# Patient Record
Sex: FEMALE | Race: BLACK OR AFRICAN AMERICAN | NOT HISPANIC OR LATINO | Employment: OTHER | URBAN - METROPOLITAN AREA
[De-identification: names, ages, dates, MRNs, and addresses within clinical notes are randomized per-mention and may not be internally consistent; named-entity substitution may affect disease eponyms.]

---

## 2017-11-27 ENCOUNTER — TRANSCRIBE ORDERS (OUTPATIENT)
Dept: ADMINISTRATIVE | Facility: HOSPITAL | Age: 62
End: 2017-11-27

## 2017-11-27 DIAGNOSIS — Z12.31 VISIT FOR SCREENING MAMMOGRAM: Primary | ICD-10-CM

## 2017-12-19 ENCOUNTER — HOSPITAL ENCOUNTER (OUTPATIENT)
Dept: MAMMOGRAPHY | Facility: HOSPITAL | Age: 62
Discharge: HOME | End: 2017-12-19
Attending: FAMILY MEDICINE | Admitting: FAMILY MEDICINE

## 2017-12-19 DIAGNOSIS — Z12.31 VISIT FOR SCREENING MAMMOGRAM: ICD-10-CM

## 2017-12-19 PROCEDURE — 77067 SCR MAMMO BI INCL CAD: CPT | Performed by: RADIOLOGY

## 2017-12-19 PROCEDURE — 77063 BREAST TOMOSYNTHESIS BI: CPT | Performed by: RADIOLOGY

## 2017-12-19 PROCEDURE — G0202 SCR MAMMO BI INCL CAD: HCPCS

## 2017-12-19 PROCEDURE — 77063 BREAST TOMOSYNTHESIS BI: CPT

## 2018-06-27 ENCOUNTER — TRANSCRIBE ORDERS (OUTPATIENT)
Dept: ADMINISTRATIVE | Facility: HOSPITAL | Age: 63
End: 2018-06-27

## 2018-06-27 ENCOUNTER — HOSPITAL ENCOUNTER (OUTPATIENT)
Dept: GENERAL RADIOLOGY | Facility: HOSPITAL | Age: 63
Discharge: HOME | End: 2018-06-27
Attending: FAMILY MEDICINE | Admitting: FAMILY MEDICINE

## 2018-06-27 DIAGNOSIS — R06.02 SHORTNESS OF BREATH: Primary | ICD-10-CM

## 2018-06-27 DIAGNOSIS — R06.02 SHORTNESS OF BREATH: ICD-10-CM

## 2018-06-27 PROCEDURE — 71046 X-RAY EXAM CHEST 2 VIEWS: CPT

## 2018-06-28 ENCOUNTER — TRANSCRIBE ORDERS (OUTPATIENT)
Dept: ADMINISTRATIVE | Facility: HOSPITAL | Age: 63
End: 2018-06-28

## 2018-06-28 DIAGNOSIS — R10.10 UPPER ABDOMINAL PAIN: Primary | ICD-10-CM

## 2018-06-29 ENCOUNTER — HOSPITAL ENCOUNTER (OUTPATIENT)
Dept: ULTRASOUND IMAGING | Facility: HOSPITAL | Age: 63
Discharge: HOME | End: 2018-06-29
Attending: FAMILY MEDICINE | Admitting: FAMILY MEDICINE

## 2018-06-29 DIAGNOSIS — R10.10 UPPER ABDOMINAL PAIN: ICD-10-CM

## 2018-06-29 PROCEDURE — 76705 ECHO EXAM OF ABDOMEN: CPT

## 2019-01-02 ENCOUNTER — TRANSCRIBE ORDERS (OUTPATIENT)
Dept: ADMINISTRATIVE | Facility: HOSPITAL | Age: 64
End: 2019-01-02

## 2019-01-02 DIAGNOSIS — Z12.31 VISIT FOR SCREENING MAMMOGRAM: Primary | ICD-10-CM

## 2019-01-04 ENCOUNTER — TRANSCRIBE ORDERS (OUTPATIENT)
Dept: ADMINISTRATIVE | Facility: HOSPITAL | Age: 64
End: 2019-01-04

## 2019-01-04 ENCOUNTER — HOSPITAL ENCOUNTER (OUTPATIENT)
Dept: GENERAL RADIOLOGY | Facility: HOSPITAL | Age: 64
Discharge: HOME | End: 2019-01-04
Admitting: NURSE PRACTITIONER

## 2019-01-04 DIAGNOSIS — K58.0 IRRITABLE BOWEL SYNDROME WITH DIARRHEA: ICD-10-CM

## 2019-01-04 DIAGNOSIS — K58.0 IRRITABLE BOWEL SYNDROME WITH DIARRHEA: Primary | ICD-10-CM

## 2019-01-04 PROCEDURE — 74019 RADEX ABDOMEN 2 VIEWS: CPT

## 2019-01-23 DIAGNOSIS — M25.512 PAIN OF BOTH SHOULDER JOINTS: Primary | ICD-10-CM

## 2019-01-23 DIAGNOSIS — M25.511 PAIN OF BOTH SHOULDER JOINTS: Primary | ICD-10-CM

## 2019-01-24 ENCOUNTER — OFFICE VISIT (OUTPATIENT)
Dept: ORTHOPEDIC SURGERY | Facility: CLINIC | Age: 64
End: 2019-01-24

## 2019-01-24 VITALS — RESPIRATION RATE: 18 BRPM | HEIGHT: 63 IN | WEIGHT: 144 LBS | BODY MASS INDEX: 25.52 KG/M2

## 2019-01-24 DIAGNOSIS — M25.512 ARTHRALGIA OF LEFT SHOULDER REGION: Primary | ICD-10-CM

## 2019-01-24 DIAGNOSIS — M75.82 ROTATOR CUFF TENDINITIS, LEFT: ICD-10-CM

## 2019-01-24 DIAGNOSIS — M19.019 AC JOINT ARTHROPATHY: ICD-10-CM

## 2019-01-24 PROCEDURE — 99203 OFFICE O/P NEW LOW 30 MIN: CPT | Performed by: PHYSICIAN ASSISTANT

## 2019-01-24 RX ORDER — RANITIDINE HCL 75 MG
75 TABLET ORAL 2 TIMES DAILY
COMMUNITY
End: 2020-12-18

## 2019-01-24 RX ORDER — DICYCLOMINE HCL 20 MG
20 TABLET ORAL EVERY 6 HOURS PRN
COMMUNITY

## 2019-01-24 NOTE — PROGRESS NOTES
"Subjective   Patient ID: Melina Minaya is a 63 y.o. right hand dominant female  Pain of the Left Shoulder and Pain of the Right Shoulder         History of Present Illness  Patient presents with bilateral shoulder pain left worse than right that has been ongoing for several months.  She denies injury or trauma.  She denies numbness or tingling to the upper extremities.  She has tried over-the-counter analgesics with no relief.  She has tried rest and warm heat with no improvement.  Unfortunately, the patient cannot tolerate oral anti-inflammatories due to \"stomach issues\"    He shouldn't reports movement outward and above her head intensify the left shoulder pain.  She finds it difficult to sleep at night due to the shoulder arthralgia  Pain Score: 9  Pain Location: Shoulder  Pain Orientation: Right, Left(left greater than right)     Pain Descriptors: Stabbing, Shooting  Pain Frequency: Constant/continuous  Pain Onset: Ongoing  Date Pain First Started: (reports pain sice October 2018)  Clinical Progression: Gradually worsening  Aggravating Factors: Stretching, Straightening        Pain Intervention(s): Rest  Result of Injury: No  Work-Related Injury: No    Past Medical History:   Diagnosis Date   • Hypertension         Past Surgical History:   Procedure Laterality Date   • APPENDECTOMY     • BREAST BIOPSY     • CHOLECYSTECTOMY     • HYSTERECTOMY     • OOPHORECTOMY         Family History   Problem Relation Age of Onset   • Cancer Mother    • Breast cancer Mother 50   • Cancer Father    • Ovarian cancer Neg Hx        Social History     Socioeconomic History   • Marital status:      Spouse name: Not on file   • Number of children: Not on file   • Years of education: Not on file   • Highest education level: Not on file   Social Needs   • Financial resource strain: Not on file   • Food insecurity - worry: Not on file   • Food insecurity - inability: Not on file   • Transportation needs - medical: Not on file " "  • Transportation needs - non-medical: Not on file   Occupational History   • Not on file   Tobacco Use   • Smoking status: Never Smoker   • Smokeless tobacco: Never Used   Substance and Sexual Activity   • Alcohol use: Yes     Alcohol/week: 1.2 oz     Types: 2 Cans of beer per week     Comment: Moderately every other day   • Drug use: No   • Sexual activity: Defer   Other Topics Concern   • Not on file   Social History Narrative   • Not on file         Current Outpatient Medications:   •  dicyclomine (BENTYL) 20 MG tablet, Take 20 mg by mouth Every 6 (Six) Hours., Disp: , Rfl:   •  estradiol (MINIVELLE, VIVELLE-DOT) 0.1 MG/24HR patch, Place 1 patch on the skin 2 (two) times a week., Disp: , Rfl:   •  lisinopril (PRINIVIL,ZESTRIL) 5 MG tablet, TAKE 1 TABLET EVERY DAY, Disp: , Rfl: 0  •  raNITIdine (ZANTAC) 75 MG tablet, Take 75 mg by mouth 2 (Two) Times a Day., Disp: , Rfl:     Allergies   Allergen Reactions   • Ceclor [Cefaclor]    • Lactose Intolerance (Gi)        Review of Systems   Constitutional: Negative.    HENT: Negative.    Respiratory: Negative.    Gastrointestinal: Negative.    Musculoskeletal: Positive for arthralgias (jake shoulder).   Skin: Negative.        Objective   Resp 18   Ht 160 cm (63\")   Wt 65.3 kg (144 lb)   BMI 25.51 kg/m²    Physical Exam   Constitutional: She is oriented to person, place, and time. She appears well-nourished.   Neck: No tracheal deviation present.   Pulmonary/Chest: Effort normal.   Musculoskeletal:        Left shoulder: She exhibits decreased range of motion (due to pain), tenderness (AC joint) and pain. She exhibits no deformity.        Left hand: She exhibits normal capillary refill and no swelling.   Neurological: She is alert and oriented to person, place, and time.   Skin: Capillary refill takes less than 2 seconds.   Psychiatric: She has a normal mood and affect.   Vitals reviewed.    Left Shoulder Exam     Range of Motion   Active abduction: 120   Forward " flexion: 120     Muscle Strength   The patient has normal left shoulder strength.    Tests   Cross arm: positive  Impingement: positive  Drop arm: negative  Sulcus: absent    Other   Sensation: normal              Neurologic Exam     Mental Status   Oriented to person, place, and time.          + Neer sign to CHIDIE    Assessment/Plan   Independent Review of Radiographic Studies:    Shows no acute fracture or dislocation.   There is acromial clavicular joint space arthritis to bilateral shoulders      Procedures       Melina was seen today for pain and pain.    Diagnoses and all orders for this visit:    Arthralgia of left shoulder region  -     MRI shoulder left wo contrast    Rotator cuff tendinitis, left  -     MRI shoulder left wo contrast    AC joint arthropathy  -     MRI shoulder left wo contrast       Orthopedic activities reviewed and patient expressed appreciation  Discussion of orthopedic goals  Risk, benefits, and merits of treatment alternatives reviewed with the patient and questions answered  Weight bearing parameters reviewed  Avoid offending activity  Ice, heat, and/or modalities as beneficial    Recommendations/Plan:  Patient is encouraged to call or return for any issues or concerns.    Patient would like to have an MRI before deciding on a left shoulder cortisone injection and/or physical therapy.  Patient agreeable to call or return sooner for any concerns.

## 2019-01-29 ENCOUNTER — HOSPITAL ENCOUNTER (OUTPATIENT)
Dept: MRI IMAGING | Facility: HOSPITAL | Age: 64
Discharge: HOME | End: 2019-01-29
Admitting: PHYSICIAN ASSISTANT

## 2019-01-29 PROCEDURE — 73221 MRI JOINT UPR EXTREM W/O DYE: CPT

## 2019-02-14 ENCOUNTER — HOSPITAL ENCOUNTER (OUTPATIENT)
Dept: MAMMOGRAPHY | Facility: HOSPITAL | Age: 64
Discharge: HOME | End: 2019-02-14
Attending: FAMILY MEDICINE | Admitting: FAMILY MEDICINE

## 2019-02-14 DIAGNOSIS — Z12.31 VISIT FOR SCREENING MAMMOGRAM: ICD-10-CM

## 2019-02-14 PROCEDURE — 77063 BREAST TOMOSYNTHESIS BI: CPT | Performed by: RADIOLOGY

## 2019-02-14 PROCEDURE — 77067 SCR MAMMO BI INCL CAD: CPT | Performed by: RADIOLOGY

## 2019-02-14 PROCEDURE — 77067 SCR MAMMO BI INCL CAD: CPT

## 2019-02-14 PROCEDURE — 77063 BREAST TOMOSYNTHESIS BI: CPT

## 2019-02-15 ENCOUNTER — OFFICE VISIT (OUTPATIENT)
Dept: ORTHOPEDIC SURGERY | Facility: CLINIC | Age: 64
End: 2019-02-15

## 2019-02-15 VITALS — BODY MASS INDEX: 25.52 KG/M2 | WEIGHT: 144 LBS | RESPIRATION RATE: 18 BRPM | HEIGHT: 63 IN

## 2019-02-15 DIAGNOSIS — M75.112 PARTIAL TEAR OF LEFT ROTATOR CUFF: ICD-10-CM

## 2019-02-15 DIAGNOSIS — M75.82 ROTATOR CUFF TENDINITIS, LEFT: ICD-10-CM

## 2019-02-15 DIAGNOSIS — M75.52 ACUTE SHOULDER BURSITIS, LEFT: Primary | ICD-10-CM

## 2019-02-15 PROCEDURE — 99213 OFFICE O/P EST LOW 20 MIN: CPT | Performed by: PHYSICIAN ASSISTANT

## 2019-02-15 RX ORDER — FLUTICASONE PROPIONATE 50 MCG
SPRAY, SUSPENSION (ML) NASAL
Refills: 2 | COMMUNITY
Start: 2019-01-21

## 2019-02-15 RX ORDER — MELOXICAM 15 MG/1
15 TABLET ORAL DAILY
Qty: 30 TABLET | Refills: 1 | Status: SHIPPED | OUTPATIENT
Start: 2019-02-15 | End: 2020-12-18

## 2019-02-15 NOTE — PROGRESS NOTES
Subjective   Patient ID: Melina Minaya is a 63 y.o. right hand dominant female  Follow-up and Pain of the Left Shoulder (Patient is here today for her MRI results, she states the pain is increasing and the shoulder is getting stiff. Her pain level is 5-6/10.)         History of Present Illness  Patient is following up in regards to left shoulder pain and to review MRI results of the left shoulder.  Patient states she still having discomfort to the left shoulder.  She reports the discomfort interferes with her sleep and daily activities                                                 Past Medical History:   Diagnosis Date   • Hypertension         Past Surgical History:   Procedure Laterality Date   • APPENDECTOMY     • BREAST BIOPSY     • CHOLECYSTECTOMY     • HYSTERECTOMY  1991   • OOPHORECTOMY Bilateral 1991       Family History   Problem Relation Age of Onset   • Cancer Mother    • Breast cancer Mother 50   • Cancer Father    • Ovarian cancer Neg Hx        Social History     Socioeconomic History   • Marital status:      Spouse name: Not on file   • Number of children: Not on file   • Years of education: Not on file   • Highest education level: Not on file   Social Needs   • Financial resource strain: Not on file   • Food insecurity - worry: Not on file   • Food insecurity - inability: Not on file   • Transportation needs - medical: Not on file   • Transportation needs - non-medical: Not on file   Occupational History   • Not on file   Tobacco Use   • Smoking status: Never Smoker   • Smokeless tobacco: Never Used   Substance and Sexual Activity   • Alcohol use: Yes     Alcohol/week: 1.2 oz     Types: 2 Cans of beer per week     Comment: Moderately every other day   • Drug use: No   • Sexual activity: Defer   Other Topics Concern   • Not on file   Social History Narrative   • Not on file         Current Outpatient Medications:   •  dicyclomine (BENTYL) 20 MG tablet, Take 20 mg by mouth Every 6 (Six)  "Hours., Disp: , Rfl:   •  estradiol (MINIVELLE, VIVELLE-DOT) 0.1 MG/24HR patch, Place 1 patch on the skin 2 (two) times a week., Disp: , Rfl:   •  fluticasone (FLONASE) 50 MCG/ACT nasal spray, INAHLE TWO PUFFS NASALLY TWO TIMES DAILY, Disp: , Rfl: 2  •  lisinopril (PRINIVIL,ZESTRIL) 5 MG tablet, TAKE 1 TABLET EVERY DAY, Disp: , Rfl: 0  •  meloxicam (MOBIC) 15 MG tablet, Take 1 tablet by mouth Daily., Disp: 30 tablet, Rfl: 1  •  raNITIdine (ZANTAC) 75 MG tablet, Take 75 mg by mouth 2 (Two) Times a Day., Disp: , Rfl:     Allergies   Allergen Reactions   • Ceclor [Cefaclor]    • Lactose Intolerance (Gi)        Review of Systems   Constitutional: Negative for fever.   HENT: Negative for voice change.    Eyes: Negative for visual disturbance.   Respiratory: Negative for shortness of breath.    Cardiovascular: Negative for chest pain.   Gastrointestinal: Negative for abdominal distention and abdominal pain.   Genitourinary: Negative for dysuria.   Musculoskeletal: Positive for arthralgias. Negative for gait problem and joint swelling.   Skin: Negative for rash.   Neurological: Negative for speech difficulty.   Hematological: Does not bruise/bleed easily.   Psychiatric/Behavioral: Negative for confusion.       Objective   Resp 18   Ht 160 cm (63\")   Wt 65.3 kg (144 lb)   BMI 25.51 kg/m²    Physical Exam   Constitutional: She is oriented to person, place, and time. She appears well-nourished.   Neck: No tracheal deviation present.   Musculoskeletal:        Left shoulder: She exhibits decreased range of motion and tenderness. She exhibits no deformity.   Neurological: She is alert and oriented to person, place, and time.   Psychiatric: She has a normal mood and affect.   Vitals reviewed.    Ortho Exam     Neurologic Exam     Mental Status   Oriented to person, place, and time.        Please see the previous office visit physical exam      Assessment/Plan   Independent Review of Radiographic Studies:    I did review MRI " results of the left shoulder with the patient.  There is a small tear to the distal suprasternal's tendon at its attachment site to the greater tuberosity, there is hypertrophic changes to the acromioclavicular joint, moderate amount of fluid in the subacromial bursa, type II acromion      Procedures       Melina was seen today for follow-up and pain.    Diagnoses and all orders for this visit:    Acute shoulder bursitis, left  -     Ambulatory Referral to Physical Therapy Evaluate and treat, Ortho; Heat; Strengthening, Stretching, ROM  -     meloxicam (MOBIC) 15 MG tablet; Take 1 tablet by mouth Daily.    Rotator cuff tendinitis, left  -     Ambulatory Referral to Physical Therapy Evaluate and treat, Ortho; Heat; Strengthening, Stretching, ROM  -     meloxicam (MOBIC) 15 MG tablet; Take 1 tablet by mouth Daily.    Partial tear of left rotator cuff  -     Ambulatory Referral to Physical Therapy Evaluate and treat, Ortho; Heat; Strengthening, Stretching, ROM  -     meloxicam (MOBIC) 15 MG tablet; Take 1 tablet by mouth Daily.       Orthopedic activities reviewed and patient expressed appreciation  Discussion of orthopedic goals  Risk, benefits, and merits of treatment alternatives reviewed with the patient and questions answered  Physical therapy referral given    Recommendations/Plan:  Exercise, medications, injections, other patient advice, and return appointment as noted.  Patient is encouraged to call or return for any issues or concerns.    We discussed the option of a cortisone injection as well as formal physical therapy.  However, patient states she has an extreme phobia and needles and would like to avoid an injection at this time.  Patient would also like to try conservative measures in lieu of surgical treatment at this time.  She states she can't tolerate anti-inflammatories sometimes irritates her acid reflux she would like to try the meloxicam and if she cannot tolerate this she will discontinue the  medicine and contact our office for the injection  Patient agreeable to call or return sooner for any concerns.

## 2019-02-25 ENCOUNTER — TREATMENT (OUTPATIENT)
Dept: PHYSICAL THERAPY | Facility: CLINIC | Age: 64
End: 2019-02-25

## 2019-02-25 DIAGNOSIS — M75.112 PARTIAL TEAR OF LEFT ROTATOR CUFF: Primary | ICD-10-CM

## 2019-02-25 DIAGNOSIS — G89.29 CHRONIC LEFT SHOULDER PAIN: ICD-10-CM

## 2019-02-25 DIAGNOSIS — M25.512 CHRONIC LEFT SHOULDER PAIN: ICD-10-CM

## 2019-02-25 PROCEDURE — 97110 THERAPEUTIC EXERCISES: CPT | Performed by: PHYSICAL THERAPIST

## 2019-02-25 PROCEDURE — 97161 PT EVAL LOW COMPLEX 20 MIN: CPT | Performed by: PHYSICAL THERAPIST

## 2019-02-25 NOTE — PROGRESS NOTES
Physical Therapy Initial Evaluation and Plan of Care      Patient: Melina Minaya   : 1955  Diagnosis/ICD-10 Code:  No primary diagnosis found.  Referring practitioner: GABRIELLA Lee    Subjective Evaluation    History of Present Illness  Date of onset: 10/25/2018  Mechanism of injury: Pt reports moving in September and noticing shoulder pain in October. Pt reports no known cause of pain. Pt reports sleeping on shoulder feels okay but has pain when moving during the night. Pt reports if she keeps her L arm down at her side she can do everything but has pain and difficulty with elevating arm. Meloxicam helps with the pain and resting helps as well.       Patient Occupation: Retired  Pain  Current pain ratin  At best pain ratin  At worst pain ratin  Quality: sharp  Aggravating factors: overhead activity and movement  Progression: improved    Social Support  Lives with: spouse    Hand dominance: right    Diagnostic Tests  X-ray: normal  MRI studies: abnormal    Treatments  Previous treatment: physical therapy  Current treatment: medication  Patient Goals  Patient goals for therapy: decreased pain, increased motion, increased strength, independence with ADLs/IADLs and return to sport/leisure activities  Patient goal: Pt wants to avoid surgery and return to hobbies           Objective       Neurological Testing     Reflexes   Left   Biceps (C5/C6): trace (1+)  Triceps (C7): trace (1+)    Right   Biceps (C5/C6): trace (1+)  Triceps (C7): trace (1+)    Passive Range of Motion   Left Shoulder   Flexion: 125 degrees   Abduction: 79 degrees   External rotation 45°: 67 degrees   Internal rotation 45°: 62 degrees     Right Shoulder   Flexion: 161 degrees   Abduction: 175 degrees   External rotation 90°: 90 degrees   Internal rotation 90°: 78 degrees     Strength/Myotome Testing   Cervical Spine     Left   Levator scapulae (C4): 4    Right   Levator scapulae (C4): 4    Left Shoulder      Planes of Motion   Left shoulder abduction strength: Unable due to pain.   External rotation at 0°: 4-   Internal rotation at 0°: 4+     Isolated Muscles   Levator scapulae: 4     Right Shoulder     Planes of Motion   Abduction: 4   External rotation at 0°: 4+   Internal rotation at 0°: 4+     Isolated Muscles   Levator scapulae: 4     Left Elbow   Extension: 4    Right Elbow   Extension: 4    Additional Strength Details  EPL: 5/5  Dorsal Palmar Interossei: 5/5    Tests     Left Shoulder   Positive full can.   Negative drop arm.     Right Shoulder   Negative drop arm and full can.     Additional Tests Details  Drop Arm test painful but not positive         Assessment & Plan     Assessment  Impairments: abnormal muscle firing, abnormal or restricted ROM, activity intolerance, impaired physical strength, lacks appropriate home exercise program and pain with function  Assessment details: Pt is a 63 year old female that presents with insidious onset of shoulder pain in L shoulder. Pt with no significant medical history. Pt with weakness and pain in shoulder with ER and abduction. Pt with palpable pain over insertion of supraspinatus tendon on humerus. Pt with limited motion and very guarded to both PROM and AROM. Pt signs, symptoms, and MRI consistent with partial rotator cuff tear. Pt would benefit from PT to address the above issues.   Prognosis: fair  Prognosis details: Short Term Goals (2 weeks)  1. Pt will demonstrate independence with HEP  2. Pt will increase PROM to equal with R shoulder  3. Pt will be able to sleep through the night with only awakening 2 times due to shoulder pain.     Long Term Goals (6 weeks)  1. Pt will increase shoulder AROM to equal with R shoulder to increase function with overhead activities  2. Pt will increase L shoulder strength to 4+/5 to help increase shoulder stability  3. Pt will have 0/10 pain when refurbishing furniture  4. Pt will be able to sleep through the night with not  being awaken due to pain.   Functional Limitations: carrying objects, sleeping, pushing, uncomfortable because of pain, reaching behind back and reaching overhead  Plan  Therapy options: will be seen for skilled physical therapy services  Planned modality interventions: cryotherapy and thermotherapy (hydrocollator packs)  Planned therapy interventions: body mechanics training, fine motor coordination training, flexibility, functional ROM exercises, home exercise program, joint mobilization, manual therapy, motor coordination training, neuromuscular re-education, postural training, soft tissue mobilization, spinal/joint mobilization, strengthening and therapeutic activities  Frequency: 2x week  Treatment plan discussed with: patient  Plan details: Pt to be seen 2x per week for 5-6 weeks        Manual Therapy:         mins  67562;  Therapeutic Exercise:    13     mins  53845;     Neuromuscular Isaura:        mins  48774;    Therapeutic Activity:          mins  58416;     Gait Training:           mins  54545;     Ultrasound:          mins  38290;    Electrical Stimulation:         mins  47069 ( );  Dry Needling          mins self-pay    Timed Treatment:   13   mins   Total Treatment:     58   mins    PT SIGNATURE: Alec Martin PT   DATE TREATMENT INITIATED: 2/25/2019    Initial Certification  Certification Period: 5/26/2019  I certify that the therapy services are furnished while this patient is under my care.  The services outlined above are required by this patient, and will be reviewed every 90 days.     PHYSICIAN: Bryant Hauser PA-C      DATE:     Please sign and return via fax to [phone].. Thank you, [facility].

## 2019-03-07 ENCOUNTER — TREATMENT (OUTPATIENT)
Dept: PHYSICAL THERAPY | Facility: CLINIC | Age: 64
End: 2019-03-07

## 2019-03-07 DIAGNOSIS — M25.512 CHRONIC LEFT SHOULDER PAIN: ICD-10-CM

## 2019-03-07 DIAGNOSIS — M75.112 PARTIAL TEAR OF LEFT ROTATOR CUFF: Primary | ICD-10-CM

## 2019-03-07 DIAGNOSIS — G89.29 CHRONIC LEFT SHOULDER PAIN: ICD-10-CM

## 2019-03-07 PROCEDURE — 97110 THERAPEUTIC EXERCISES: CPT | Performed by: PHYSICAL THERAPIST

## 2019-03-07 PROCEDURE — 97140 MANUAL THERAPY 1/> REGIONS: CPT | Performed by: PHYSICAL THERAPIST

## 2019-03-07 NOTE — PROGRESS NOTES
Physical Therapy Daily Progress Note        Melina Minaya reports 0/10 pain today at rest.  Pt reports that her L shoulder has been feeling much better since initial visit although has only done her HEP a few times. Pt reports that she still gets catching and pain if she goes overhead or to far into ER with L shoulder. Pt reports that she is sleeping better and has been much more relaxed since last visit.         Objective Pt present to PT today with no distress at rest.     Pt tolerated exercises well today but was unable to do ER with theraband due to pain in shoulder.     Pt with some catching with wall slides but no increased pain.       See Exercise, Manual, and Modality Logs for complete treatment.     Assessment/Plan  Pt with less pain but shoulder still very irritable and with limited ROM due to pain with flexion, abduction, and ER. PT continues to stress importance of HEP and scapular control with activities. Pt would benefit from PT to help increase shoulder mobility and function and decrease pain to help restore full use of L UE.       Progress per Plan of Care  Progress as tolerated           Manual Therapy:    15     mins  61220;  Therapeutic Exercise:    34     mins  92191;     Neuromuscular Isaura:        mins  17124;    Therapeutic Activity:          mins  07538;     Gait Training:        ___  mins  30707;     Ultrasound:          mins  30467;    Electrical Stimulation:         mins  24323 ( );  Dry Needling          mins self-pay    Timed Treatment:   49   mins   Total Treatment:     59   mins    Alec Martin, PT  Physical Therapist

## 2019-03-14 ENCOUNTER — TREATMENT (OUTPATIENT)
Dept: PHYSICAL THERAPY | Facility: CLINIC | Age: 64
End: 2019-03-14

## 2019-03-14 PROCEDURE — 97112 NEUROMUSCULAR REEDUCATION: CPT | Performed by: PHYSICAL THERAPIST

## 2019-03-14 PROCEDURE — 97140 MANUAL THERAPY 1/> REGIONS: CPT | Performed by: PHYSICAL THERAPIST

## 2019-03-14 PROCEDURE — 97110 THERAPEUTIC EXERCISES: CPT | Performed by: PHYSICAL THERAPIST

## 2019-03-14 NOTE — PROGRESS NOTES
Physical Therapy Daily Progress Note        Melina Minaya reports 0/10 pain today at rest.  Pt reports falling while trying to help someone roller skate on Sunday and falling on her outstretched L arm. Pt states that she has had no pain resulting from the fall. Pt reports that she is more confident when using the arm and shoulder and less afraid of hurting it.         Objective Pt present to PT today with no distress at rest.     Pt tolerated exercises well today with no increased pain in shoulder. Pt was able to perform ER actively without pain.       See Exercise, Manual, and Modality Logs for complete treatment.     Assessment/Plan  Pt continues to improve with more function and less pain. Pt still is guarded against flexion and abduction above head although she has the range with wall slides. Pt would benefit from PT to help increase strength and motion in shoulder to restore full, pain free function.       Progress per Plan of Care  Assess ROM           Manual Therapy:    11     mins  82523;  Therapeutic Exercise:    13     mins  42382;     Neuromuscular Isaura:    18    mins  85688;    Therapeutic Activity:          mins  21657;     Gait Training:        ___  mins  41200;     Ultrasound:          mins  49515;    Electrical Stimulation:         mins  43860 ( );  Dry Needling          mins self-pay    Timed Treatment:   42   mins   Total Treatment:     52   mins    Alec Martin, PT  Physical Therapist

## 2019-03-19 ENCOUNTER — TREATMENT (OUTPATIENT)
Dept: PHYSICAL THERAPY | Facility: CLINIC | Age: 64
End: 2019-03-19

## 2019-03-19 DIAGNOSIS — M75.112 PARTIAL TEAR OF LEFT ROTATOR CUFF: Primary | ICD-10-CM

## 2019-03-19 PROCEDURE — 97110 THERAPEUTIC EXERCISES: CPT | Performed by: PHYSICAL THERAPIST

## 2019-03-19 PROCEDURE — 97140 MANUAL THERAPY 1/> REGIONS: CPT | Performed by: PHYSICAL THERAPIST

## 2019-03-19 PROCEDURE — 97112 NEUROMUSCULAR REEDUCATION: CPT | Performed by: PHYSICAL THERAPIST

## 2019-03-19 NOTE — PROGRESS NOTES
Physical Therapy Daily Progress Note        Melina Minaya reports 0/10 pain today at rest.  Pt reports doing some painting and using her left shoulder to do some rolling. Pt reports shoulder was very sore afterwards and has been guarded since. Pt states she still has difficulty finding a comfortable position to sleep at night.         Objective Pt present to PT today with no distress at rest.     Pt guarded with PROM today and PT was unable to achieve full flexion. However, pt able to fully flex shoulder with wall slides.    Pt tolerated exercises well today with no increased pain in shoulder.       See Exercise, Manual, and Modality Logs for complete treatment.     Assessment/Plan  Pt continues to do better with shoulder stability and scapular stabilization training. Pt continues to be guarded with movement but is improving with general function. Pt would benefit from PT to help increase shoulder strength, decrease pain, and improve function to help return to daily activities.       Progress per Plan of Care  Progress as tolerated           Manual Therapy:    12     mins  73331;  Therapeutic Exercise:    18     mins  85281;     Neuromuscular Isaura:   16     mins  62375;    Therapeutic Activity:          mins  34555;     Gait Training:        ___  mins  63658;     Ultrasound:          mins  05124;    Electrical Stimulation:         mins  18335 ( );  Dry Needling          mins self-pay    Timed Treatment:   46   mins   Total Treatment:     56   mins    Alec Martin, PT  Physical Therapist

## 2019-03-21 ENCOUNTER — TREATMENT (OUTPATIENT)
Dept: PHYSICAL THERAPY | Facility: CLINIC | Age: 64
End: 2019-03-21

## 2019-03-21 DIAGNOSIS — G89.29 CHRONIC LEFT SHOULDER PAIN: ICD-10-CM

## 2019-03-21 DIAGNOSIS — M25.512 CHRONIC LEFT SHOULDER PAIN: ICD-10-CM

## 2019-03-21 DIAGNOSIS — M75.112 PARTIAL TEAR OF LEFT ROTATOR CUFF: Primary | ICD-10-CM

## 2019-03-21 PROCEDURE — 97110 THERAPEUTIC EXERCISES: CPT | Performed by: PHYSICAL THERAPIST

## 2019-03-21 PROCEDURE — 97112 NEUROMUSCULAR REEDUCATION: CPT | Performed by: PHYSICAL THERAPIST

## 2019-03-21 NOTE — PROGRESS NOTES
Physical Therapy Daily Progress Note        Melina Minaya reports 0/10 pain today at rest.  Pt reports that she does not recall being sore after last visit. Pt states she seems to do better and better the more strengthening she does in PT. Pt states that the strengthening and relaxing has been the most helpful to her.         Objective Pt present to PT today with no distress at rest.     Pt tolerated exercises well with no increased pain in L shoulder with activity.     Pt able to perform active ER with L shoulder with TB today.       See Exercise, Manual, and Modality Logs for complete treatment.     Assessment/Plan  Pt continues to do well with mobility and shoulder strengthening. Pt does well when in control but guards the shoulder a lot with manual therapy. Pt would benefit from PT to help continue to strengthen shoulder and increase function to improve activity tolerance and decrease pain with movement.       Progress per Plan of Care  progress strengthening           Manual Therapy:         mins  06710;  Therapeutic Exercise:    28     mins  96027;     Neuromuscular Isaura:    13    mins  21331;    Therapeutic Activity:          mins  81922;     Gait Training:        ___  mins  92826;     Ultrasound:          mins  62779;    Electrical Stimulation:         mins  89367 ( );  Dry Needling          mins self-pay    Timed Treatment:   41   mins   Total Treatment:     51   mins    Alec Martin, PT  Physical Therapist

## 2019-03-25 ENCOUNTER — TREATMENT (OUTPATIENT)
Dept: PHYSICAL THERAPY | Facility: CLINIC | Age: 64
End: 2019-03-25

## 2019-03-25 DIAGNOSIS — G89.29 CHRONIC LEFT SHOULDER PAIN: ICD-10-CM

## 2019-03-25 DIAGNOSIS — M75.112 PARTIAL TEAR OF LEFT ROTATOR CUFF: Primary | ICD-10-CM

## 2019-03-25 DIAGNOSIS — M25.512 CHRONIC LEFT SHOULDER PAIN: ICD-10-CM

## 2019-03-25 PROCEDURE — 97110 THERAPEUTIC EXERCISES: CPT | Performed by: PHYSICAL THERAPIST

## 2019-03-25 PROCEDURE — 97112 NEUROMUSCULAR REEDUCATION: CPT | Performed by: PHYSICAL THERAPIST

## 2019-03-25 PROCEDURE — 97140 MANUAL THERAPY 1/> REGIONS: CPT | Performed by: PHYSICAL THERAPIST

## 2019-03-25 NOTE — PROGRESS NOTES
Physical Therapy Daily Progress Note        Melina Minaya reports 0/10 pain today at rest.  Pt reports that she had a very good weekend and the shoulder has been much better with only slight twinges of pain with activities. Pt reports that she is sleeping much better without shoulder pain. Pt reports that she has been using the L shoulder a lot more at home.         Objective Pt present to PT today with no distress at rest.     Pt tolerated exercises well today without increased pain in L shoulder.       See Exercise, Manual, and Modality Logs for complete treatment.     Assessment/Plan  Pt continues to improve with light strengthening and mobility exercises. Pt continues to improve with greater function and activity tolerance with daily activities. Pt would benefit from PT to help continue to improve strength and motion in the shoulder to return to pain free function of shoulder.       Progress per Plan of Care  Progress as tolerated            Manual Therapy:    9     mins  72870;  Therapeutic Exercise:    28     mins  79006;     Neuromuscular Isaura:    11    mins  50459;    Therapeutic Activity:          mins  57061;     Gait Training:        ___  mins  04402;     Ultrasound:          mins  37983;    Electrical Stimulation:         mins  14022 ( );  Dry Needling          mins self-pay    Timed Treatment:   49   mins   Total Treatment:     59   mins    Alec Martin, PT  Physical Therapist

## 2019-03-28 ENCOUNTER — TREATMENT (OUTPATIENT)
Dept: PHYSICAL THERAPY | Facility: CLINIC | Age: 64
End: 2019-03-28

## 2019-03-28 DIAGNOSIS — M25.512 CHRONIC LEFT SHOULDER PAIN: ICD-10-CM

## 2019-03-28 DIAGNOSIS — G89.29 CHRONIC LEFT SHOULDER PAIN: ICD-10-CM

## 2019-03-28 DIAGNOSIS — M75.112 PARTIAL TEAR OF LEFT ROTATOR CUFF: Primary | ICD-10-CM

## 2019-03-28 PROCEDURE — 97112 NEUROMUSCULAR REEDUCATION: CPT | Performed by: PHYSICAL THERAPIST

## 2019-03-28 PROCEDURE — 97110 THERAPEUTIC EXERCISES: CPT | Performed by: PHYSICAL THERAPIST

## 2019-03-28 NOTE — PROGRESS NOTES
Physical Therapy Daily Progress Note        Melina Minaya reports 1/10 pain today at rest.  Pt reports that she has been using the L shoulder a lot more and guarding much less against movement. Pt reports that she raked leaves 2 days ago without any problems in the shoulder. Pt reports being very pleased with progress so far.         Objective Pt present to PT today with no distress at rest.     Pt tolerated exercises well today with no increased pain with exercises.       See Exercise, Manual, and Modality Logs for complete treatment.     Assessment/Plan  Pt continues to improve with function of L shoulder with less pain although she continues to have some catching in L shoulder with random movements. Pt would benefit from continued postural training and scapular/shoulder stabilization to help improve shoulder function and decrease pain with activities.       Progress per Plan of Care  Progress as tolerated            Manual Therapy:         mins  45726;  Therapeutic Exercise:    25     mins  46856;     Neuromuscular Isaura:    11    mins  11002;    Therapeutic Activity:          mins  76972;     Gait Training:        ___  mins  98210;     Ultrasound:          mins  68695;    Electrical Stimulation:         mins  44317 ( );  Dry Needling          mins self-pay    Timed Treatment:   36   mins   Total Treatment:     46   mins    Alec Martin, PT  Physical Therapist

## 2019-04-04 ENCOUNTER — TREATMENT (OUTPATIENT)
Dept: PHYSICAL THERAPY | Facility: CLINIC | Age: 64
End: 2019-04-04

## 2019-04-04 DIAGNOSIS — M75.112 PARTIAL TEAR OF LEFT ROTATOR CUFF: Primary | ICD-10-CM

## 2019-04-04 DIAGNOSIS — G89.29 CHRONIC LEFT SHOULDER PAIN: ICD-10-CM

## 2019-04-04 DIAGNOSIS — M25.512 CHRONIC LEFT SHOULDER PAIN: ICD-10-CM

## 2019-04-04 PROCEDURE — 97110 THERAPEUTIC EXERCISES: CPT | Performed by: PHYSICAL THERAPIST

## 2019-04-04 PROCEDURE — 97112 NEUROMUSCULAR REEDUCATION: CPT | Performed by: PHYSICAL THERAPIST

## 2019-04-04 NOTE — PROGRESS NOTES
Physical Therapy Daily Progress Note        Melina Minaya reports 1/10 pain today at rest.  Pt reports that she pushed a couch helping move furniture on Tuesday and had a lot of soreness the next day. Pt reports that she has not slept well since then but she continues to do much better than she had been doing.         Objective Pt present to PT today with no distress at rest.     Pt tolerated exercises well today with no increased pain in L shoulder after session.       See Exercise, Manual, and Modality Logs for complete treatment.     Assessment/Plan  Pt continues to improve with greater activity tolerance and less guarding in the L shoulder. Pt would benefit from continued strength and stability training for increase L shoulder mobility and function to return to pain free daily activities.       Progress per Plan of Care  Progress as tolerated           Manual Therapy:         mins  01092;  Therapeutic Exercise:    32     mins  64013;     Neuromuscular Isaura:    14    mins  59827;    Therapeutic Activity:          mins  20287;     Gait Training:        ___  mins  95678;     Ultrasound:          mins  03401;    Electrical Stimulation:         mins  11495 ( );  Dry Needling          mins self-pay    Timed Treatment:   46   mins   Total Treatment:     46   mins    Alec Martin, PT  Physical Therapist

## 2019-04-08 ENCOUNTER — TREATMENT (OUTPATIENT)
Dept: PHYSICAL THERAPY | Facility: CLINIC | Age: 64
End: 2019-04-08

## 2019-04-08 DIAGNOSIS — M25.512 CHRONIC LEFT SHOULDER PAIN: ICD-10-CM

## 2019-04-08 DIAGNOSIS — M75.112 PARTIAL TEAR OF LEFT ROTATOR CUFF: Primary | ICD-10-CM

## 2019-04-08 DIAGNOSIS — G89.29 CHRONIC LEFT SHOULDER PAIN: ICD-10-CM

## 2019-04-08 PROCEDURE — 97110 THERAPEUTIC EXERCISES: CPT | Performed by: PHYSICAL THERAPIST

## 2019-04-08 PROCEDURE — 97112 NEUROMUSCULAR REEDUCATION: CPT | Performed by: PHYSICAL THERAPIST

## 2019-04-08 NOTE — PROGRESS NOTES
Physical Therapy Daily Progress Note        Melina Minaya reports 0/10 pain today at rest.  Pt reports that she has not been doing anything that will aggravate the L shoulder. Pt states that her shoulder has been feeling good since last visit.         Objective       Active Range of Motion   Left Shoulder   Flexion: 154 degrees   Abduction: 127 degrees   External rotation 90°: 75 degrees   Internal rotation 90°: 67 degrees     Strength/Myotome Testing     Left Shoulder     Planes of Motion   External rotation at 0°: 4   Internal rotation at 0°: 5    Pt present to PT today with no distress at rest.     Pt tolerated exercises well today without increased pain in shoulder.       See Exercise, Manual, and Modality Logs for complete treatment.     Assessment/Plan  Pt does very well with active movement and has improved ROM in L shoulder from initial visit with less pain and more function. Pt is still limited with overhead function due to pain and catching but continues to improve. Pt would benefit from PT to help increase strength, function, and overhead mobility in L shoulder for pain free home activities and hobbies.       Progress per Plan of Care  Await orders           Manual Therapy:         mins  87528;  Therapeutic Exercise:    31     mins  89738;     Neuromuscular Isaura:    15    mins  17809;    Therapeutic Activity:          mins  16690;     Gait Training:        ___  mins  69615;     Ultrasound:          mins  77693;    Electrical Stimulation:         mins  77585 ( );  Dry Needling          mins self-pay    Timed Treatment:   46   mins   Total Treatment:     46   mins    Alec Martin, PT  Physical Therapist

## 2019-04-11 ENCOUNTER — OFFICE VISIT (OUTPATIENT)
Dept: ORTHOPEDIC SURGERY | Facility: CLINIC | Age: 64
End: 2019-04-11

## 2019-04-11 VITALS — HEIGHT: 63 IN | WEIGHT: 146 LBS | BODY MASS INDEX: 25.87 KG/M2 | RESPIRATION RATE: 18 BRPM

## 2019-04-11 DIAGNOSIS — M75.112 PARTIAL TEAR OF LEFT ROTATOR CUFF: ICD-10-CM

## 2019-04-11 DIAGNOSIS — M19.019 AC JOINT ARTHROPATHY: ICD-10-CM

## 2019-04-11 DIAGNOSIS — M75.82 ROTATOR CUFF TENDINITIS, LEFT: Primary | ICD-10-CM

## 2019-04-11 PROCEDURE — 99213 OFFICE O/P EST LOW 20 MIN: CPT | Performed by: PHYSICIAN ASSISTANT

## 2019-04-11 NOTE — PROGRESS NOTES
Subjective   Patient ID: Melina Minaya is a 63 y.o. right hand dominant female  Follow-up and Pain of the Left Shoulder (Patient is here today for a follow up on her shoulders, she states her pain is .5/10. ) and Follow-up and Pain of the Right Shoulder         History of Present Illness    Patient is following up at his scheduled appointment regards to left shoulder arthralgia.  Patient has attended approximately 16 visits of formal physical therapy and is glad to say she has noticed about a 75% improvement.  She states she is still very cautious with certain movements.  She does perform her home exercises which seemed to alleviate pain and stiffness.  She does take the meloxicam as needed        Past Medical History:   Diagnosis Date   • Allergic    • Arthritis    • Hypertension         Past Surgical History:   Procedure Laterality Date   • APPENDECTOMY     • BREAST BIOPSY     • CHOLECYSTECTOMY     • HYSTERECTOMY  1991   • OOPHORECTOMY Bilateral 1991       Family History   Problem Relation Age of Onset   • Cancer Mother    • Breast cancer Mother 50   • Cancer Father    • Ovarian cancer Neg Hx        Social History     Socioeconomic History   • Marital status:      Spouse name: Not on file   • Number of children: Not on file   • Years of education: Not on file   • Highest education level: Not on file   Tobacco Use   • Smoking status: Never Smoker   • Smokeless tobacco: Never Used   Substance and Sexual Activity   • Alcohol use: Yes     Alcohol/week: 3.6 oz     Types: 6 Cans of beer per week     Comment: Moderately every other day   • Drug use: No   • Sexual activity: Defer         Current Outpatient Medications:   •  dicyclomine (BENTYL) 20 MG tablet, Take 20 mg by mouth Every 6 (Six) Hours., Disp: , Rfl:   •  estradiol (MINIVELLE, VIVELLE-DOT) 0.1 MG/24HR patch, Place 1 patch on the skin 2 (two) times a week., Disp: , Rfl:   •  fluticasone (FLONASE) 50 MCG/ACT nasal spray, INAHLE TWO PUFFS NASALLY TWO  "TIMES DAILY, Disp: , Rfl: 2  •  lisinopril (PRINIVIL,ZESTRIL) 5 MG tablet, TAKE 1 TABLET EVERY DAY, Disp: , Rfl: 0  •  meloxicam (MOBIC) 15 MG tablet, Take 1 tablet by mouth Daily., Disp: 30 tablet, Rfl: 1  •  raNITIdine (ZANTAC) 75 MG tablet, Take 75 mg by mouth 2 (Two) Times a Day., Disp: , Rfl:     Allergies   Allergen Reactions   • Ceclor [Cefaclor]    • Lactose Intolerance (Gi)        Review of Systems   Constitutional: Negative for fever.   HENT: Negative for voice change.    Eyes: Negative for visual disturbance.   Respiratory: Negative for shortness of breath.    Cardiovascular: Negative for chest pain.   Gastrointestinal: Negative for abdominal distention and abdominal pain.   Genitourinary: Negative for dysuria.   Musculoskeletal: Positive for arthralgias. Negative for gait problem and joint swelling.   Skin: Negative for rash.   Neurological: Negative for speech difficulty.   Hematological: Does not bruise/bleed easily.   Psychiatric/Behavioral: Negative for confusion.       Objective   Resp 18   Ht 160 cm (63\")   Wt 66.2 kg (146 lb)   BMI 25.86 kg/m²    Physical Exam   Constitutional: She is oriented to person, place, and time. She appears well-developed.   Pulmonary/Chest: Effort normal.   Musculoskeletal:        Left shoulder: She exhibits tenderness (AC joint). She exhibits no crepitus, no deformity and no spasm.        Left hand: She exhibits normal capillary refill. Normal sensation noted. Normal strength noted.   Neurological: She is alert and oriented to person, place, and time.   Psychiatric: She has a normal mood and affect.   Vitals reviewed.    Left Shoulder Exam     Range of Motion   Active abduction: 130   Forward flexion: 140   Internal rotation 0 degrees: Sacrum   Internal rotation 90 degrees: 80     Muscle Strength   The patient has normal left shoulder strength.    Tests   Apprehension: negative  Cross arm: negative  Drop arm: negative    Other   Sensation: normal     Comments:  Neg. " [place]'s test               Neurologic Exam     Mental Status   Oriented to person, place, and time.              Assessment/Plan   Independent Review of Radiographic Studies:    No new imaging done today.  Reviewed with patient at a prior visit.      Procedures       Melina was seen today for follow-up, pain, follow-up and pain.    Diagnoses and all orders for this visit:    Rotator cuff tendinitis, left    Partial tear of left rotator cuff    AC joint arthropathy       Discussion of orthopedic goals  Risk, benefits, and merits of treatment alternatives reviewed with the patient and questions answered  Physical therapy ongoing  Reduced physical activity as appropriate  Weight bearing parameters reviewed  Ice, heat, and/or modalities as beneficial    Recommendations/Plan:  Exercise, medications, injections, other patient advice, and return appointment as noted.  Patient is encouraged to call or return for any issues or concerns.    Because the patient is improving with therapy and time I would like to reevaluate her in August as a 20 may consider subacromial bursa injection if still experiencing discomfort and not feeling 100% improved  Patient agreeable to call or return sooner for any concerns.

## 2019-07-05 ENCOUNTER — TRANSCRIBE ORDERS (OUTPATIENT)
Dept: ADMINISTRATIVE | Facility: HOSPITAL | Age: 64
End: 2019-07-05

## 2019-07-05 DIAGNOSIS — R51.9 NEW ONSET OF HEADACHES AFTER AGE 50: Primary | ICD-10-CM

## 2019-07-15 ENCOUNTER — HOSPITAL ENCOUNTER (OUTPATIENT)
Dept: MRI IMAGING | Facility: HOSPITAL | Age: 64
Discharge: HOME | End: 2019-07-15
Admitting: NURSE PRACTITIONER

## 2019-07-15 DIAGNOSIS — R51.9 NEW ONSET OF HEADACHES AFTER AGE 50: ICD-10-CM

## 2019-07-15 LAB — CREAT BLDA-MCNC: 0.8 MG/DL (ref 0.6–1.3)

## 2019-07-15 PROCEDURE — 70553 MRI BRAIN STEM W/O & W/DYE: CPT

## 2019-07-15 PROCEDURE — 82565 ASSAY OF CREATININE: CPT

## 2019-07-15 PROCEDURE — 0 GADOBENATE DIMEGLUMINE 529 MG/ML SOLUTION: Performed by: NURSE PRACTITIONER

## 2019-07-15 PROCEDURE — A9577 INJ MULTIHANCE: HCPCS | Performed by: NURSE PRACTITIONER

## 2019-07-15 RX ADMIN — GADOBENATE DIMEGLUMINE 13 ML: 529 INJECTION, SOLUTION INTRAVENOUS at 09:26

## 2020-02-10 ENCOUNTER — TRANSCRIBE ORDERS (OUTPATIENT)
Dept: ADMINISTRATIVE | Facility: HOSPITAL | Age: 65
End: 2020-02-10

## 2020-02-10 DIAGNOSIS — Z12.31 VISIT FOR SCREENING MAMMOGRAM: Primary | ICD-10-CM

## 2020-03-18 ENCOUNTER — HOSPITAL ENCOUNTER (OUTPATIENT)
Dept: MAMMOGRAPHY | Facility: HOSPITAL | Age: 65
Discharge: HOME | End: 2020-03-18
Admitting: FAMILY MEDICINE

## 2020-03-18 DIAGNOSIS — Z12.31 VISIT FOR SCREENING MAMMOGRAM: ICD-10-CM

## 2020-03-18 PROCEDURE — 77063 BREAST TOMOSYNTHESIS BI: CPT

## 2020-03-18 PROCEDURE — 77063 BREAST TOMOSYNTHESIS BI: CPT | Performed by: RADIOLOGY

## 2020-03-18 PROCEDURE — 77067 SCR MAMMO BI INCL CAD: CPT

## 2020-03-18 PROCEDURE — 77067 SCR MAMMO BI INCL CAD: CPT | Performed by: RADIOLOGY

## 2020-03-26 ENCOUNTER — HOSPITAL ENCOUNTER (OUTPATIENT)
Dept: ULTRASOUND IMAGING | Facility: HOSPITAL | Age: 65
Discharge: HOME | End: 2020-03-26
Admitting: RADIOLOGY

## 2020-03-26 DIAGNOSIS — R92.8 ABNORMAL MAMMOGRAM: ICD-10-CM

## 2020-03-26 PROCEDURE — 76642 ULTRASOUND BREAST LIMITED: CPT | Performed by: RADIOLOGY

## 2020-03-26 PROCEDURE — 76642 ULTRASOUND BREAST LIMITED: CPT

## 2020-10-20 ENCOUNTER — APPOINTMENT (OUTPATIENT)
Dept: CT IMAGING | Facility: HOSPITAL | Age: 65
End: 2020-10-20

## 2020-10-20 ENCOUNTER — APPOINTMENT (OUTPATIENT)
Dept: GENERAL RADIOLOGY | Facility: HOSPITAL | Age: 65
End: 2020-10-20

## 2020-10-20 ENCOUNTER — HOSPITAL ENCOUNTER (EMERGENCY)
Facility: HOSPITAL | Age: 65
Discharge: HOME | End: 2020-10-20
Attending: EMERGENCY MEDICINE | Admitting: EMERGENCY MEDICINE

## 2020-10-20 VITALS
WEIGHT: 145 LBS | HEART RATE: 84 BPM | TEMPERATURE: 97.1 F | RESPIRATION RATE: 16 BRPM | OXYGEN SATURATION: 96 % | SYSTOLIC BLOOD PRESSURE: 152 MMHG | DIASTOLIC BLOOD PRESSURE: 86 MMHG | HEIGHT: 63 IN | BODY MASS INDEX: 25.69 KG/M2

## 2020-10-20 DIAGNOSIS — I10 ACCELERATED HYPERTENSION: Primary | ICD-10-CM

## 2020-10-20 LAB
ALBUMIN SERPL-MCNC: 4.6 G/DL (ref 3.5–5.2)
ALBUMIN/GLOB SERPL: 1.5 G/DL
ALP SERPL-CCNC: 127 U/L (ref 39–117)
ALT SERPL W P-5'-P-CCNC: 37 U/L (ref 1–33)
ANION GAP SERPL CALCULATED.3IONS-SCNC: 12.3 MMOL/L (ref 5–15)
AST SERPL-CCNC: 43 U/L (ref 1–32)
BASOPHILS # BLD AUTO: 0.05 10*3/MM3 (ref 0–0.2)
BASOPHILS NFR BLD AUTO: 1 % (ref 0–1.5)
BILIRUB SERPL-MCNC: 0.5 MG/DL (ref 0–1.2)
BILIRUB UR QL STRIP: NEGATIVE
BUN SERPL-MCNC: 13 MG/DL (ref 8–23)
BUN/CREAT SERPL: 13 (ref 7–25)
CALCIUM SPEC-SCNC: 9.7 MG/DL (ref 8.6–10.5)
CHLORIDE SERPL-SCNC: 100 MMOL/L (ref 98–107)
CLARITY UR: CLEAR
CO2 SERPL-SCNC: 26.7 MMOL/L (ref 22–29)
COLOR UR: YELLOW
CREAT SERPL-MCNC: 1 MG/DL (ref 0.57–1)
DEPRECATED RDW RBC AUTO: 45.8 FL (ref 37–54)
EOSINOPHIL # BLD AUTO: 0.08 10*3/MM3 (ref 0–0.4)
EOSINOPHIL NFR BLD AUTO: 1.6 % (ref 0.3–6.2)
ERYTHROCYTE [DISTWIDTH] IN BLOOD BY AUTOMATED COUNT: 13.5 % (ref 12.3–15.4)
GFR SERPL CREATININE-BSD FRML MDRD: 68 ML/MIN/1.73
GLOBULIN UR ELPH-MCNC: 3.1 GM/DL
GLUCOSE SERPL-MCNC: 116 MG/DL (ref 65–99)
GLUCOSE UR STRIP-MCNC: NEGATIVE MG/DL
HCT VFR BLD AUTO: 45.2 % (ref 34–46.6)
HGB BLD-MCNC: 14.8 G/DL (ref 12–15.9)
HGB UR QL STRIP.AUTO: NEGATIVE
IMM GRANULOCYTES # BLD AUTO: 0.02 10*3/MM3 (ref 0–0.05)
IMM GRANULOCYTES NFR BLD AUTO: 0.4 % (ref 0–0.5)
KETONES UR QL STRIP: NEGATIVE
LEUKOCYTE ESTERASE UR QL STRIP.AUTO: NEGATIVE
LYMPHOCYTES # BLD AUTO: 2.15 10*3/MM3 (ref 0.7–3.1)
LYMPHOCYTES NFR BLD AUTO: 43.2 % (ref 19.6–45.3)
MCH RBC QN AUTO: 30.3 PG (ref 26.6–33)
MCHC RBC AUTO-ENTMCNC: 32.7 G/DL (ref 31.5–35.7)
MCV RBC AUTO: 92.4 FL (ref 79–97)
MONOCYTES # BLD AUTO: 0.38 10*3/MM3 (ref 0.1–0.9)
MONOCYTES NFR BLD AUTO: 7.6 % (ref 5–12)
NEUTROPHILS NFR BLD AUTO: 2.3 10*3/MM3 (ref 1.7–7)
NEUTROPHILS NFR BLD AUTO: 46.2 % (ref 42.7–76)
NITRITE UR QL STRIP: NEGATIVE
NRBC BLD AUTO-RTO: 0 /100 WBC (ref 0–0.2)
PH UR STRIP.AUTO: 7 [PH] (ref 5–8)
PLATELET # BLD AUTO: 309 10*3/MM3 (ref 140–450)
PMV BLD AUTO: 11 FL (ref 6–12)
POTASSIUM SERPL-SCNC: 3.8 MMOL/L (ref 3.5–5.2)
PROT SERPL-MCNC: 7.7 G/DL (ref 6–8.5)
PROT UR QL STRIP: NEGATIVE
RBC # BLD AUTO: 4.89 10*6/MM3 (ref 3.77–5.28)
SODIUM SERPL-SCNC: 139 MMOL/L (ref 136–145)
SP GR UR STRIP: <=1.005 (ref 1–1.03)
TROPONIN T SERPL-MCNC: <0.01 NG/ML (ref 0–0.03)
UROBILINOGEN UR QL STRIP: NORMAL
WBC # BLD AUTO: 4.98 10*3/MM3 (ref 3.4–10.8)

## 2020-10-20 PROCEDURE — 84484 ASSAY OF TROPONIN QUANT: CPT | Performed by: PHYSICIAN ASSISTANT

## 2020-10-20 PROCEDURE — 93005 ELECTROCARDIOGRAM TRACING: CPT | Performed by: PHYSICIAN ASSISTANT

## 2020-10-20 PROCEDURE — 70450 CT HEAD/BRAIN W/O DYE: CPT

## 2020-10-20 PROCEDURE — 81003 URINALYSIS AUTO W/O SCOPE: CPT | Performed by: PHYSICIAN ASSISTANT

## 2020-10-20 PROCEDURE — 85025 COMPLETE CBC W/AUTO DIFF WBC: CPT | Performed by: PHYSICIAN ASSISTANT

## 2020-10-20 PROCEDURE — 80053 COMPREHEN METABOLIC PANEL: CPT | Performed by: PHYSICIAN ASSISTANT

## 2020-10-20 PROCEDURE — 71045 X-RAY EXAM CHEST 1 VIEW: CPT

## 2020-10-20 PROCEDURE — 99284 EMERGENCY DEPT VISIT MOD MDM: CPT

## 2020-10-20 RX ORDER — SODIUM CHLORIDE 0.9 % (FLUSH) 0.9 %
10 SYRINGE (ML) INJECTION AS NEEDED
Status: DISCONTINUED | OUTPATIENT
Start: 2020-10-20 | End: 2020-10-21 | Stop reason: HOSPADM

## 2020-10-20 RX ORDER — AMLODIPINE BESYLATE 5 MG/1
5 TABLET ORAL DAILY
Qty: 30 TABLET | Refills: 0 | Status: SHIPPED | OUTPATIENT
Start: 2020-10-20 | End: 2020-12-18 | Stop reason: SDUPTHER

## 2020-10-20 RX ORDER — AMLODIPINE BESYLATE 5 MG/1
5 TABLET ORAL ONCE
Status: COMPLETED | OUTPATIENT
Start: 2020-10-20 | End: 2020-10-20

## 2020-10-20 RX ADMIN — AMLODIPINE BESYLATE 5 MG: 5 TABLET ORAL at 23:52

## 2020-10-21 NOTE — ED PROVIDER NOTES
Subjective   This is a 64-year-old female that presents to the emergency department with chief complaint dizziness, hypertension intermittently for the past 2 weeks.  Patient states that she has had blood pressures of 170/100 fairly consistently over the past 2 weeks.  Patient states tonight blood pressure climbed even higher at 180/110.  Patient does states she has had associated nausea without vomiting and generalized fatigue and weakness.  Patient denies any associated shortness of breath, chest pain, localized weakness.  Patient has been taking her antihypertensive medications as prescribed.  Has had mild intermittent headaches.      History provided by:  Patient   used: No    Dizziness  Quality:  Lightheadedness  Severity:  Moderate  Onset quality:  Gradual  Duration:  1 week  Timing:  Intermittent  Progression:  Worsening  Chronicity:  New  Context: not when bending over and not with bowel movement    Relieved by:  Nothing  Worsened by:  Nothing  Ineffective treatments:  None tried  Associated symptoms: headaches and weakness    Associated symptoms: no blood in stool and no shortness of breath    Risk factors: no anemia, no heart disease, no hx of vertigo, no Meniere's disease, no multiple medications and no new medications    Hypertension  Severity:  Moderate  Onset quality:  Gradual  Duration:  2 days  Timing:  Intermittent  Progression:  Worsening  Chronicity:  New  Time since last dose of antihypertensive:  2 weeks  Notable PTA blood pressures:  180/100  Context: not caffeine, not herbal remedies and not medication change    Relieved by:  ACE inhibitors  Worsened by:  Nothing  Ineffective treatments:  None tried  Associated symptoms: dizziness, headaches and weakness    Associated symptoms: no abdominal pain, no confusion, no peripheral edema and no shortness of breath    Risk factors: no alcohol use, no cocaine use, no decongestant use, no family history of hypertension, no prior  stroke and no PVD        Review of Systems   Eyes: Negative.  Negative for pain, redness and itching.   Respiratory: Negative.  Negative for cough, choking and shortness of breath.    Cardiovascular: Negative.    Gastrointestinal: Negative.  Negative for abdominal pain and blood in stool.   Genitourinary: Negative.  Negative for enuresis and pelvic pain.   Musculoskeletal: Negative.    Skin: Negative.  Negative for color change, pallor, rash and wound.   Neurological: Positive for dizziness, weakness and headaches. Negative for seizures, light-headedness and numbness.   Hematological: Negative.    Psychiatric/Behavioral: Negative.  Negative for agitation, behavioral problems, confusion, decreased concentration and dysphoric mood.   All other systems reviewed and are negative.      Past Medical History:   Diagnosis Date   • Allergic    • Arthritis    • Hypertension        Allergies   Allergen Reactions   • Ceclor [Cefaclor]    • Lactose Intolerance (Gi)        Past Surgical History:   Procedure Laterality Date   • APPENDECTOMY     • BREAST BIOPSY Left     Prior to 2006   • CHOLECYSTECTOMY     • HYSTERECTOMY  1991   • OOPHORECTOMY Bilateral 1991       Family History   Problem Relation Age of Onset   • Cancer Mother    • Breast cancer Mother 50   • Cancer Father    • Breast cancer Maternal Grandmother    • Ovarian cancer Neg Hx        Social History     Socioeconomic History   • Marital status:      Spouse name: Not on file   • Number of children: Not on file   • Years of education: Not on file   • Highest education level: Not on file   Tobacco Use   • Smoking status: Never Smoker   • Smokeless tobacco: Never Used   Substance and Sexual Activity   • Alcohol use: Yes     Alcohol/week: 6.0 standard drinks     Types: 6 Cans of beer per week     Comment: Moderately every other day   • Drug use: No   • Sexual activity: Defer           Objective   Physical Exam  Vitals signs and nursing note reviewed.    Constitutional:       General: She is not in acute distress.     Appearance: Normal appearance. She is normal weight. She is not ill-appearing, toxic-appearing or diaphoretic.   HENT:      Head: Normocephalic and atraumatic.      Right Ear: Tympanic membrane, ear canal and external ear normal. There is no impacted cerumen.      Left Ear: Tympanic membrane, ear canal and external ear normal. There is no impacted cerumen.      Nose: Nose normal. No congestion or rhinorrhea.      Mouth/Throat:      Mouth: Mucous membranes are moist.      Pharynx: Oropharynx is clear. No oropharyngeal exudate or posterior oropharyngeal erythema.   Eyes:      General: No scleral icterus.        Right eye: No discharge.         Left eye: No discharge.      Extraocular Movements: Extraocular movements intact.      Conjunctiva/sclera: Conjunctivae normal.      Pupils: Pupils are equal, round, and reactive to light.   Neck:      Musculoskeletal: Normal range of motion and neck supple. No neck rigidity or muscular tenderness.      Vascular: No carotid bruit.   Cardiovascular:      Rate and Rhythm: Normal rate and regular rhythm.      Pulses: Normal pulses.      Heart sounds: Normal heart sounds. No murmur. No friction rub. No gallop.    Pulmonary:      Effort: Pulmonary effort is normal. No respiratory distress.      Breath sounds: Normal breath sounds. No stridor. No wheezing, rhonchi or rales.   Chest:      Chest wall: No tenderness.   Abdominal:      General: Abdomen is flat. Bowel sounds are normal. There is no distension.      Palpations: Abdomen is soft. There is no mass.      Tenderness: There is no abdominal tenderness. There is no right CVA tenderness, guarding or rebound.   Musculoskeletal: Normal range of motion.         General: No swelling, tenderness, deformity or signs of injury.      Right lower leg: No edema.   Lymphadenopathy:      Cervical: No cervical adenopathy.   Skin:     General: Skin is warm and dry.      Capillary  Refill: Capillary refill takes less than 2 seconds.      Coloration: Skin is not jaundiced or pale.      Findings: No bruising, erythema, lesion or rash.   Neurological:      General: No focal deficit present.      Mental Status: She is alert and oriented to person, place, and time.      Cranial Nerves: No cranial nerve deficit.      Sensory: No sensory deficit.      Motor: No weakness.      Coordination: Coordination normal.   Psychiatric:         Mood and Affect: Mood normal.         Behavior: Behavior normal.         Thought Content: Thought content normal.         Judgment: Judgment normal.         Procedures           ED Course  ED Course as of Oct 21 0438   Tue Oct 20, 2020   2130 Endorsed to Dr. Gaspar     [BH]   Wed Oct 21, 2020   0438 Troponin T: <0.010 [PF]   0438 Glucose(!): 116 [PF]   0438 BUN: 13 [PF]   0438 Creatinine: 1.00 [PF]   0438 Sodium: 139 [PF]   0438 Potassium: 3.8 [PF]   0438 Chloride: 100 [PF]   0438 CO2: 26.7 [PF]   0438 Calcium: 9.7 [PF]   0438 Total Protein: 7.7 [PF]   0438 Albumin: 4.60 [PF]   0438 ALT (SGPT)(!): 37 [PF]   0438 AST (SGOT)(!): 43 [PF]   0438 Alkaline Phosphatase(!): 127 [PF]   0438 Total Bilirubin: 0.5 [PF]   0438 WBC: 4.98 [PF]   0438 Hemoglobin: 14.8 [PF]   0438 Hematocrit: 45.2 [PF]   0438 Platelets: 309 [PF]   0438 Urobilinogen, UA: 0.2 E.U./dL [PF]   0438 Nitrite, UA: Negative [PF]   0438 Leukocytes, UA: Negative [PF]   0438 Protein, UA: Negative [PF]   0438 Blood, UA: Negative [PF]   0438 Bilirubin, UA: Negative [PF]   0438 Ketones, UA: Negative [PF]   0438 Glucose: Negative [PF]   0438 Specific Gravity, UA: <=1.005 [PF]   0438 Chest x-ray interpreted by me shows no evidence of any cardiomegaly, effusion, infiltrate, or bony abnormality.    [PF]   0438    CLINICAL HISTORY:  dizziness, htn     FINDINGS:  The ventricles are normal.  There is no mass or other abnormal  hypodensity.  There is no shift of midline structures.  There is  no intracranial hemorrhage.  No  acute sinus or osseous  abnormality is seen.     IMPRESSION:  Unremarkable.     Authenticated by Pavel Ray M.D. on 10/20/2020 10:27:45 PM    [PF]      ED Course User Index  [BH] Brad Lam PA-C  [PF] Ole Gaspar,       KG interpreted by me reveals sinus rhythm rate of 85.  No ectopy no ischemic changes normal KG.                                     MDM  Received care from Nicho Robins.  Patient peers well nontoxic.  Blood pressure spontaneously improved 152/86.  Will add Norvasc 5 mg daily.  Advised blood pressure diary, outpatient follow-up with her primary care physician.  Continue other home medications.  Return precautions were discussed.  No evidence of ACS lab or electrolyte abnormality.  Negative head CT.  Final diagnoses:   Accelerated hypertension            Ole Gaspar,   10/21/20 0439       Ole Gaspar,   10/21/20 0516

## 2020-12-17 PROBLEM — Z90.49 HX OF CHOLECYSTECTOMY: Status: ACTIVE | Noted: 2020-12-17

## 2020-12-17 PROBLEM — I10 ESSENTIAL HYPERTENSION: Status: ACTIVE | Noted: 2020-12-17

## 2020-12-17 NOTE — PROGRESS NOTES
[facility]  Cardiology Consultation Note     DATE: 12/18/2020  Requesting Provider: GENE Tsai  PCP: José Ramso MD    IDENTIFICATION: Melina Minaya is a 65 y.o. female who resides in [place], [place].    REASON FOR CONSULTATION: Hypertension          Dear Roselia MILLS,    Thank you for referring Melina Minaya to my office for evaluation of hypertension.  The patient is a 65-year-old -American female with a history of hypertension.  On 10/20/2020, she presented to [facility] with episode of dizziness and hypertension.  The patient noted that her blood pressure was 180/110 mmHg at home.  This was odd for her as it is normally well controlled.  The patient was taking lisinopril 40 mg daily monotherapy at that time.  Her blood pressure improved spontaneously while in the ER and she was discharged home.    Since her discharge, she is discontinue lisinopril and has been started on amlodipine and HCTZ.  She brings with her a blood pressure log which shows acceptable blood pressure measurements.  She does complain of lower extremity swelling since starting amlodipine.  She also complains of a generalized fatigue feeling.  She denies worsening exertional intolerance, chest discomfort, exertional shortness of breath, or orthopnea.  She does have occasional palpitation symptoms.  She thinks it be hard to catch the symptoms as they do not occur but about once a week and are short natured.      Past Medical History, Past Surgical History, Family history, Social History, and Medications were all reviewed with the patient today and updated as necessary.       Current Outpatient Medications:   •  amLODIPine (NORVASC) 5 MG tablet, Take 1 tablet by mouth Daily., Disp: 90 tablet, Rfl: 1  •  Cholecalciferol (Vitamin D3) 50 MCG (2000 UT) tablet, Take 2,000 Units by mouth Daily., Disp: , Rfl:   •  dicyclomine (BENTYL) 20 MG tablet, Take 20 mg by  mouth Every 6 (Six) Hours As Needed., Disp: , Rfl:   •  docusate sodium (COLACE) 100 MG capsule, Take 100 mg by mouth Daily., Disp: , Rfl:   •  estradiol (MINIVELLE, VIVELLE-DOT) 0.1 MG/24HR patch, Place 1 patch on the skin 2 (two) times a week., Disp: , Rfl:   •  famotidine (PEPCID) 40 MG tablet, Take 40 mg by mouth Daily., Disp: , Rfl:   •  fluticasone (FLONASE) 50 MCG/ACT nasal spray, INAHLE TWO PUFFS NASALLY TWO TIMES DAILY, Disp: , Rfl: 2  •  hydroCHLOROthiazide (HYDRODIURIL) 12.5 MG tablet, Take 1 tablet by mouth Daily., Disp:  , Rfl:   •  cloNIDine (Catapres) 0.1 MG tablet, Take 1 tablet by mouth As Needed for High Blood Pressure (for SBP > 180 mmHg or DBP > 100 mmHg)., Disp: 30 tablet, Rfl: 1    Allergies   Allergen Reactions   • Ceclor [Cefaclor]    • Lactose Intolerance (Gi)          Past Medical History:   Diagnosis Date   • Acid reflux    • Allergic    • Arthritis    • Hypertension    • Palpitations        Past Surgical History:   Procedure Laterality Date   • APPENDECTOMY     • BREAST BIOPSY Left     Prior to 2006   • CHOLECYSTECTOMY     • HYSTERECTOMY  1991   • OOPHORECTOMY Bilateral 1991       Family History   Problem Relation Age of Onset   • Cancer Mother    • Breast cancer Mother 50   • Cancer Father    • Breast cancer Maternal Grandmother    • Ovarian cancer Neg Hx        Social History     Tobacco Use   • Smoking status: Never Smoker   • Smokeless tobacco: Never Used   Substance Use Topics   • Alcohol use: Yes     Alcohol/week: 6.0 standard drinks     Types: 6 Cans of beer per week     Comment: Moderately every other day       Review of Systems   Constitution: Negative for malaise/fatigue.   Eyes: Negative for vision loss in left eye and vision loss in right eye.   Cardiovascular: Positive for palpitations. Negative for chest pain, dyspnea on exertion, near-syncope, orthopnea, paroxysmal nocturnal dyspnea and syncope.   Respiratory: Negative.    Musculoskeletal: Negative for myalgias.  "  Neurological: Negative for brief paralysis, excessive daytime sleepiness, focal weakness, numbness, paresthesias and weakness.   All other systems reviewed and are negative.              /72 (BP Location: Right arm, Patient Position: Sitting)   Pulse 75   Ht 160 cm (63\")   Wt 64.9 kg (143 lb)   SpO2 97%   BMI 25.33 kg/m²        Constitutional:       Appearance: Healthy appearance. Well-developed.   Eyes:      General: Lids are normal. No scleral icterus.     Conjunctiva/sclera: Conjunctivae normal.   HENT:      Head: Normocephalic and atraumatic.   Neck:      Musculoskeletal: Normal range of motion.      Thyroid: No thyromegaly.      Vascular: No carotid bruit or JVD.   Pulmonary:      Effort: Pulmonary effort is normal.      Breath sounds: Normal breath sounds. No wheezing. No rhonchi. No rales.   Cardiovascular:      Normal rate. Regular rhythm.      Murmurs: There is no murmur.      No gallop. No rub.   Pulses:     Intact distal pulses.   Abdominal:      General: There is no distension.      Palpations: Abdomen is soft. There is no abdominal mass.   Skin:     General: Skin is warm and dry.      Findings: No rash.   Neurological:      General: No focal deficit present.      Mental Status: Alert and oriented to person, place, and time.      Gait: Gait is intact.   Psychiatric:         Attention and Perception: Attention normal.         Mood and Affect: Mood normal.         Behavior: Behavior normal.             ECG 12 Lead    Date/Time: 12/18/2020 1:46 PM  Performed by: Lionel Mena IV, MD  Authorized by: Lionel Mena IV, MD   Rhythm: sinus rhythm  BPM: 75  Other findings: poor R wave progression    Clinical impression: non-specific ECG            Lab Results   Component Value Date    GLUCOSE 116 (H) 10/20/2020    BUN 13 10/20/2020    CREATININE 1.00 10/20/2020    EGFRIFAFRI 68 10/20/2020    BCR 13.0 10/20/2020    K 3.8 10/20/2020    CO2 26.7 10/20/2020    CALCIUM 9.7 " 10/20/2020    ALBUMIN 4.60 10/20/2020    ALKPHOS 127 (H) 10/20/2020    AST 43 (H) 10/20/2020    ALT 37 (H) 10/20/2020      Lab Results   Component Value Date    WBC 4.98 10/20/2020    RBC 4.89 10/20/2020    HGB 14.8 10/20/2020    HCT 45.2 10/20/2020    MCV 92.4 10/20/2020     10/20/2020       Lipid Panel, 2020:   TC: 194   Tri   HDL: 62   LDL: 100       Problem List Items Addressed This Visit        Cardiology Problems    Essential hypertension - Primary    Overview     · Exercise echo (2015): Average exercise capacity.  No ischemia by clinical, EKG or echo criteria.           Relevant Medications    amLODIPine (NORVASC) 5 MG tablet    hydroCHLOROthiazide (HYDRODIURIL) 12.5 MG tablet    cloNIDine (Catapres) 0.1 MG tablet    Other Relevant Orders    ECG 12 Lead    Comprehensive Metabolic Panel    Lipid Panel    TSH+Free T4    CBC (No Diff)    Palpitations    Relevant Orders    TSH+Free T4                   · Obtain CMP, CBC lipids, CBC  · Reduce amlodipine to 5 mg daily  · Patient will monitor blood pressure at home and report to office after holidays  · Defer Holter monitoring unless symptoms persist or worsen  Return in about 3 months (around 3/18/2021).          ANDRES Mena MD [facility]  Interventional and General Cardiology    20  17:43 EST

## 2020-12-18 ENCOUNTER — CONSULT (OUTPATIENT)
Dept: CARDIOLOGY | Facility: CLINIC | Age: 65
End: 2020-12-18

## 2020-12-18 VITALS
WEIGHT: 143 LBS | DIASTOLIC BLOOD PRESSURE: 72 MMHG | BODY MASS INDEX: 25.34 KG/M2 | SYSTOLIC BLOOD PRESSURE: 122 MMHG | OXYGEN SATURATION: 97 % | HEART RATE: 75 BPM | HEIGHT: 63 IN

## 2020-12-18 DIAGNOSIS — R00.2 PALPITATIONS: ICD-10-CM

## 2020-12-18 DIAGNOSIS — I10 ESSENTIAL HYPERTENSION: Primary | ICD-10-CM

## 2020-12-18 PROBLEM — Z90.49 HX OF CHOLECYSTECTOMY: Status: RESOLVED | Noted: 2020-12-17 | Resolved: 2020-12-18

## 2020-12-18 PROCEDURE — 99203 OFFICE O/P NEW LOW 30 MIN: CPT | Performed by: INTERNAL MEDICINE

## 2020-12-18 PROCEDURE — 93000 ELECTROCARDIOGRAM COMPLETE: CPT | Performed by: INTERNAL MEDICINE

## 2020-12-18 RX ORDER — CLONIDINE HYDROCHLORIDE 0.1 MG/1
0.1 TABLET ORAL AS NEEDED
Qty: 30 TABLET | Refills: 1 | Status: SHIPPED | OUTPATIENT
Start: 2020-12-18

## 2020-12-18 RX ORDER — HYDROCHLOROTHIAZIDE 12.5 MG/1
12.5 TABLET ORAL DAILY
Status: CANCELLED
Start: 2020-12-18

## 2020-12-18 RX ORDER — CHOLECALCIFEROL (VITAMIN D3) 125 MCG
2000 CAPSULE ORAL DAILY
COMMUNITY

## 2020-12-18 RX ORDER — DOCUSATE SODIUM 100 MG/1
100 CAPSULE, LIQUID FILLED ORAL 2 TIMES DAILY PRN
COMMUNITY

## 2020-12-18 RX ORDER — AMLODIPINE BESYLATE 5 MG/1
5 TABLET ORAL DAILY
Qty: 90 TABLET | Refills: 1
Start: 2020-12-18

## 2020-12-18 RX ORDER — FAMOTIDINE 40 MG/1
40 TABLET, FILM COATED ORAL DAILY
COMMUNITY

## 2020-12-18 RX ORDER — HYDROCHLOROTHIAZIDE 12.5 MG/1
12.5 TABLET ORAL DAILY
COMMUNITY
End: 2020-12-18 | Stop reason: SDUPTHER

## 2020-12-18 RX ORDER — HYDROCHLOROTHIAZIDE 12.5 MG/1
12.5 TABLET ORAL DAILY
Start: 2020-12-18

## 2020-12-18 NOTE — ASSESSMENT & PLAN NOTE
· Blood pressure log dated today shows much improved control of blood pressure with calcium channel blocker and thiazide diuretic, both of which represent classes of antihypertensive particularly effective in -American populations.  · Reduce amlodipine dose to 5 mg daily due to complaints of swelling  · Patient will monitor blood pressure at home and report averages via [employer]  · Reassess blood pressure in 3 months  · Clonidine 0.1 mg as needed for SBP >180 mmHg or DBP >100 mmHg

## 2020-12-26 LAB
ALBUMIN SERPL-MCNC: 4.5 G/DL (ref 3.5–5.2)
ALBUMIN/GLOB SERPL: 1.4 G/DL
ALP SERPL-CCNC: 129 U/L (ref 39–117)
ALT SERPL-CCNC: 37 U/L (ref 1–33)
AST SERPL-CCNC: 22 U/L (ref 1–32)
BASOPHILS # BLD AUTO: 0.03 10*3/MM3 (ref 0–0.2)
BASOPHILS NFR BLD AUTO: 0.8 % (ref 0–1.5)
BILIRUB SERPL-MCNC: 0.9 MG/DL (ref 0–1.2)
BUN SERPL-MCNC: 13 MG/DL (ref 8–23)
BUN/CREAT SERPL: 13 (ref 7–25)
CALCIUM SERPL-MCNC: 9.6 MG/DL (ref 8.6–10.5)
CHLORIDE SERPL-SCNC: 99 MMOL/L (ref 98–107)
CHOLEST SERPL-MCNC: 208 MG/DL (ref 0–200)
CO2 SERPL-SCNC: 30.1 MMOL/L (ref 22–29)
CREAT SERPL-MCNC: 1 MG/DL (ref 0.57–1)
EOSINOPHIL # BLD AUTO: 0.07 10*3/MM3 (ref 0–0.4)
EOSINOPHIL NFR BLD AUTO: 1.9 % (ref 0.3–6.2)
ERYTHROCYTE [DISTWIDTH] IN BLOOD BY AUTOMATED COUNT: 13 % (ref 12.3–15.4)
GLOBULIN SER CALC-MCNC: 3.3 GM/DL
GLUCOSE SERPL-MCNC: 86 MG/DL (ref 65–99)
HCT VFR BLD AUTO: 46.2 % (ref 34–46.6)
HDLC SERPL-MCNC: 78 MG/DL (ref 40–60)
HGB BLD-MCNC: 15.2 G/DL (ref 12–15.9)
IMM GRANULOCYTES # BLD AUTO: 0.01 10*3/MM3 (ref 0–0.05)
IMM GRANULOCYTES NFR BLD AUTO: 0.3 % (ref 0–0.5)
LDLC SERPL CALC-MCNC: 116 MG/DL (ref 0–100)
LDLC/HDLC SERPL: 1.46 {RATIO}
LYMPHOCYTES # BLD AUTO: 1.45 10*3/MM3 (ref 0.7–3.1)
LYMPHOCYTES NFR BLD AUTO: 40.4 % (ref 19.6–45.3)
MCH RBC QN AUTO: 29.9 PG (ref 26.6–33)
MCHC RBC AUTO-ENTMCNC: 32.9 G/DL (ref 31.5–35.7)
MCV RBC AUTO: 90.9 FL (ref 79–97)
MONOCYTES # BLD AUTO: 0.3 10*3/MM3 (ref 0.1–0.9)
MONOCYTES NFR BLD AUTO: 8.4 % (ref 5–12)
NEUTROPHILS # BLD AUTO: 1.73 10*3/MM3 (ref 1.7–7)
NEUTROPHILS NFR BLD AUTO: 48.2 % (ref 42.7–76)
NRBC BLD AUTO-RTO: 0 /100 WBC (ref 0–0.2)
PLATELET # BLD AUTO: 369 10*3/MM3 (ref 140–450)
POTASSIUM SERPL-SCNC: 3.9 MMOL/L (ref 3.5–5.2)
PROT SERPL-MCNC: 7.8 G/DL (ref 6–8.5)
RBC # BLD AUTO: 5.08 10*6/MM3 (ref 3.77–5.28)
SODIUM SERPL-SCNC: 139 MMOL/L (ref 136–145)
T4 FREE SERPL DIALY-MCNC: 1.2 NG/DL
TRIGL SERPL-MCNC: 80 MG/DL (ref 0–150)
TSH SERPL-ACNC: 1.4 UU/ML
VLDLC SERPL CALC-MCNC: 14 MG/DL (ref 5–40)
WBC # BLD AUTO: 3.59 10*3/MM3 (ref 3.4–10.8)

## 2020-12-28 ENCOUNTER — TELEPHONE (OUTPATIENT)
Dept: CARDIOLOGY | Facility: CLINIC | Age: 65
End: 2020-12-28

## 2020-12-28 NOTE — TELEPHONE ENCOUNTER
Left VM advising of below and to contact office with any questions or concerns.       ----- Message from GENE Richmond sent at 12/28/2020  2:16 PM EST -----  Regarding: Send letter  Let her know her total cholesterol and LDL are mildly elevated.  We will not recommend statin therapy at this time but will recheck them in 6 months and discuss if needs to be on cholesterol medicine at that time.  ----- Message -----  From: Morris, Reflab Results In  Sent: 12/28/2020   8:45 AM EST  To: Lionel Mena IV, MD

## 2021-03-25 ENCOUNTER — IMMUNIZATION (OUTPATIENT)
Dept: VACCINE CLINIC | Facility: HOSPITAL | Age: 66
End: 2021-03-25

## 2021-03-25 PROCEDURE — 91300 HC SARSCOV02 VAC 30MCG/0.3ML IM: CPT | Performed by: INTERNAL MEDICINE

## 2021-03-25 PROCEDURE — 0001A: CPT | Performed by: INTERNAL MEDICINE

## 2021-04-15 ENCOUNTER — IMMUNIZATION (OUTPATIENT)
Dept: VACCINE CLINIC | Facility: HOSPITAL | Age: 66
End: 2021-04-15

## 2021-04-15 PROCEDURE — 91300 HC SARSCOV02 VAC 30MCG/0.3ML IM: CPT | Performed by: INTERNAL MEDICINE

## 2021-04-15 PROCEDURE — 0002A: CPT | Performed by: INTERNAL MEDICINE

## 2021-09-10 NOTE — PROGRESS NOTES
"[facility]      Identification: Melina Minaya is a 65 y.o. female who resides in [place], [place]    Reason for visit:  · Palpitations  · Hypertension  · Chest pain  · Dyspnea    Subjective      Melina Minaya presents to [facility] for followup.    History of Present Illness  Patient is 65-year-old lady who returns today at the request of her primary care provider for worsening palpitations/flutters, dyspnea and chest pain.  The patient was seen in new consultation in December for uncontrolled hypertension.  Her blood pressures have been better since then.  She did complain of palpitations then.  She reports having flutters when lying down in bed particularly on her left side.  She is not necessarily symptomatic when they occur and they are brief.  She has read online this can be caused by GERD.  She does have an appoint with a GI doctor next week.  The patient also reports having periods of feeling like she cannot get enough air.  This may or may not be related to exertional activity.  She tries to ride her stationary bicycle a few times a week and when doing so she will develop burning and heaviness in her chest.  She did not know if this could be related to GERD or not.  She has hyperlipidemia with elevated cholesterol of 206 and an LDL of 116 but is not on any cholesterol medicine.      Objective     /66 (BP Location: Right arm, Patient Position: Sitting)   Pulse 84   Ht 160 cm (63\")   Wt 65.8 kg (145 lb)   SpO2 98%   BMI 25.69 kg/m²       Constitutional:       Appearance: Healthy appearance. Well-developed.   Eyes:      General: Lids are normal. No scleral icterus.     Conjunctiva/sclera: Conjunctivae normal.   HENT:      Head: Normocephalic and atraumatic.   Neck:      Thyroid: No thyromegaly.      Vascular: No carotid bruit or JVD.   Pulmonary:      Effort: Pulmonary effort is normal.      Breath sounds: Normal breath sounds. No wheezing. No rhonchi. No " rales.   Cardiovascular:      Normal rate. Regular rhythm.      Murmurs: There is no murmur.      No gallop. No rub.   Pulses:     Intact distal pulses.   Edema:     Peripheral edema absent.   Abdominal:      General: There is no distension.      Palpations: Abdomen is soft. There is no abdominal mass.   Musculoskeletal:      Cervical back: Normal range of motion. Skin:     General: Skin is warm and dry.      Findings: No rash.   Neurological:      General: No focal deficit present.      Mental Status: Alert and oriented to person, place, and time.      Gait: Gait is intact.   Psychiatric:         Attention and Perception: Attention normal.         Mood and Affect: Mood normal.         Behavior: Behavior normal.         Result Review :    Lab Results   Component Value Date    GLUCOSE 116 (H) 10/20/2020    BUN 13 12/18/2020    CREATININE 1.00 12/18/2020    EGFRIFNONA 56 (L) 12/18/2020    EGFRIFAFRI 68 12/18/2020    BCR 13.0 12/18/2020    K 3.9 12/18/2020    CO2 30.1 (H) 12/18/2020    CALCIUM 9.6 12/18/2020    PROTENTOTREF 7.8 12/18/2020    ALBUMIN 4.50 12/18/2020    LABIL2 1.4 12/18/2020    AST 22 12/18/2020    ALT 37 (H) 12/18/2020     Lab Results   Component Value Date    WBC 3.59 12/18/2020    HGB 15.2 12/18/2020    HCT 46.2 12/18/2020    MCV 90.9 12/18/2020     12/18/2020     Lab Results   Component Value Date    CHLPL 208 (H) 12/18/2020    TRIG 80 12/18/2020    HDL 78 (H) 12/18/2020     (H) 12/18/2020     Laboratory data 7/16/2021: Sodium 140, potassium 3.7, BUN 15, creatinine 0.86    ECG 12 Lead    Date/Time: 9/14/2021 4:05 PM  Performed by: Vi Brice APRN  Authorized by: Vi Brice APRN   Comparison: compared with previous ECG from 12/18/2020  Similar to previous ECG  Rhythm: sinus rhythm  BPM: 78    Clinical impression: normal ECG  Comments: QT/QTc 378/430             Assessment     Problem List Items Addressed This Visit        Cardiac and Vasculature    Palpitations     Overview     · Unremarkable EKG, 12/18/2020  · Normal EKG 9/14/2021         Current Assessment & Plan     · Obtain echocardiogram         Relevant Orders    Adult Transthoracic Echo Complete W/ Cont if Necessary Per Protocol    Dyspnea on exertion    Overview     · Patient reports progressive shortness of breath         Current Assessment & Plan     · Obtain echocardiogram         Relevant Orders    Adult Transthoracic Echo Complete W/ Cont if Necessary Per Protocol    Essential hypertension    Overview     · Exercise echo (7/1/2015): Average exercise capacity.  No ischemia by clinical, EKG or echo criteria.     [facility] presentation with hypertensive urgency despite lisinopril  Target blood pressure <130/80 mmHg           Current Assessment & Plan     · Hypertension is controlled  · Continue clonidine 0.1 mg as needed  · Continue hydrochlorothiazide 12.5 mg daily  · Continue amlodipine 5 mg daily         Angina pectoris (CMS/HCC) - Primary    Overview     · Stress echo (7/1/2015): No ischemia.  · Patient reports chest burning with exertion 9/14/2021         Current Assessment & Plan     · Schedule myocardial perfusion study         Relevant Orders    Stress Test With Myocardial Perfusion (1 Day)      Patient reports NYHA class II-III symptoms and CCS class II symptoms.  We will schedule myocardial perfusion study and echocardiogram.  Further recommendations to follow.    Plan   • Obtain echocardiogram and stress test  • Return in 6 weeks to discuss results      Follow-up   Return in about 6 weeks (around 10/26/2021), or if symptoms worsen or fail to improve.        Vi Brice, APRN  9/14/2021

## 2021-09-14 ENCOUNTER — OFFICE VISIT (OUTPATIENT)
Dept: CARDIOLOGY | Facility: CLINIC | Age: 66
End: 2021-09-14

## 2021-09-14 VITALS
SYSTOLIC BLOOD PRESSURE: 110 MMHG | DIASTOLIC BLOOD PRESSURE: 66 MMHG | BODY MASS INDEX: 25.69 KG/M2 | OXYGEN SATURATION: 98 % | HEART RATE: 84 BPM | WEIGHT: 145 LBS | HEIGHT: 63 IN

## 2021-09-14 DIAGNOSIS — I20.9 ANGINA PECTORIS (HCC): Primary | ICD-10-CM

## 2021-09-14 DIAGNOSIS — R00.2 PALPITATIONS: ICD-10-CM

## 2021-09-14 DIAGNOSIS — R06.09 DYSPNEA ON EXERTION: ICD-10-CM

## 2021-09-14 DIAGNOSIS — I10 ESSENTIAL HYPERTENSION: ICD-10-CM

## 2021-09-14 PROCEDURE — 93000 ELECTROCARDIOGRAM COMPLETE: CPT | Performed by: NURSE PRACTITIONER

## 2021-09-14 PROCEDURE — 99214 OFFICE O/P EST MOD 30 MIN: CPT | Performed by: NURSE PRACTITIONER

## 2021-09-14 NOTE — ASSESSMENT & PLAN NOTE
· Hypertension is controlled  · Continue clonidine 0.1 mg as needed  · Continue hydrochlorothiazide 12.5 mg daily  · Continue amlodipine 5 mg daily

## 2021-10-06 ENCOUNTER — HOSPITAL ENCOUNTER (OUTPATIENT)
Dept: CARDIOLOGY | Facility: HOSPITAL | Age: 66
Discharge: HOME | End: 2021-10-06

## 2021-10-06 DIAGNOSIS — R00.2 PALPITATIONS: ICD-10-CM

## 2021-10-06 DIAGNOSIS — I20.9 ANGINA PECTORIS (HCC): ICD-10-CM

## 2021-10-06 DIAGNOSIS — R06.09 DYSPNEA ON EXERTION: ICD-10-CM

## 2021-10-06 PROCEDURE — 78452 HT MUSCLE IMAGE SPECT MULT: CPT | Performed by: INTERNAL MEDICINE

## 2021-10-06 PROCEDURE — 93017 CV STRESS TEST TRACING ONLY: CPT

## 2021-10-06 PROCEDURE — 93306 TTE W/DOPPLER COMPLETE: CPT | Performed by: INTERNAL MEDICINE

## 2021-10-06 PROCEDURE — 78452 HT MUSCLE IMAGE SPECT MULT: CPT

## 2021-10-06 PROCEDURE — 0 TECHNETIUM SESTAMIBI: Performed by: NURSE PRACTITIONER

## 2021-10-06 PROCEDURE — A9500 TC99M SESTAMIBI: HCPCS | Performed by: NURSE PRACTITIONER

## 2021-10-06 PROCEDURE — 93306 TTE W/DOPPLER COMPLETE: CPT

## 2021-10-06 PROCEDURE — 93018 CV STRESS TEST I&R ONLY: CPT | Performed by: INTERNAL MEDICINE

## 2021-10-06 RX ADMIN — TECHNETIUM TC 99M SESTAMIBI 1 DOSE: 1 INJECTION INTRAVENOUS at 08:05

## 2021-10-06 RX ADMIN — TECHNETIUM TC 99M SESTAMIBI 1 DOSE: 1 INJECTION INTRAVENOUS at 10:03

## 2021-10-07 LAB
BH CV ECHO MEAS - AO MAX PG (FULL): 4.9 MMHG
BH CV ECHO MEAS - AO MAX PG: 9 MMHG
BH CV ECHO MEAS - AO MEAN PG (FULL): 1 MMHG
BH CV ECHO MEAS - AO MEAN PG: 3 MMHG
BH CV ECHO MEAS - AO ROOT AREA (BSA CORRECTED): 1.5
BH CV ECHO MEAS - AO ROOT AREA: 5.3 CM^2
BH CV ECHO MEAS - AO ROOT DIAM: 2.6 CM
BH CV ECHO MEAS - AO V2 MAX: 154 CM/SEC
BH CV ECHO MEAS - AO V2 MEAN: 82.1 CM/SEC
BH CV ECHO MEAS - AO V2 VTI: 25.5 CM
BH CV ECHO MEAS - ASC AORTA: 2.4 CM
BH CV ECHO MEAS - AVA(I,A): 2 CM^2
BH CV ECHO MEAS - AVA(I,D): 2 CM^2
BH CV ECHO MEAS - AVA(V,A): 1.7 CM^2
BH CV ECHO MEAS - AVA(V,D): 1.7 CM^2
BH CV ECHO MEAS - BSA(HAYCOCK): 1.7 M^2
BH CV ECHO MEAS - BSA: 1.7 M^2
BH CV ECHO MEAS - BZI_BMI: 25.7 KILOGRAMS/M^2
BH CV ECHO MEAS - BZI_METRIC_HEIGHT: 160 CM
BH CV ECHO MEAS - BZI_METRIC_WEIGHT: 65.8 KG
BH CV ECHO MEAS - EDV(CUBED): 57.1 ML
BH CV ECHO MEAS - EDV(MOD-SP4): 67 ML
BH CV ECHO MEAS - EDV(TEICH): 63.9 ML
BH CV ECHO MEAS - EF(CUBED): 65.1 %
BH CV ECHO MEAS - EF(MOD-BP): 60 %
BH CV ECHO MEAS - EF(MOD-SP4): 61.2 %
BH CV ECHO MEAS - EF(TEICH): 57.3 %
BH CV ECHO MEAS - ESV(CUBED): 19.9 ML
BH CV ECHO MEAS - ESV(MOD-SP4): 26 ML
BH CV ECHO MEAS - ESV(TEICH): 27.3 ML
BH CV ECHO MEAS - FS: 29.6 %
BH CV ECHO MEAS - IVS/LVPW: 0.81
BH CV ECHO MEAS - IVSD: 0.65 CM
BH CV ECHO MEAS - LA DIMENSION: 3.3 CM
BH CV ECHO MEAS - LA/AO: 1.3
BH CV ECHO MEAS - LAD MAJOR: 4.3 CM
BH CV ECHO MEAS - LAT PEAK E' VEL: 8.9 CM/SEC
BH CV ECHO MEAS - LATERAL E/E' RATIO: 8.3
BH CV ECHO MEAS - LV DIASTOLIC VOL/BSA (35-75): 39.7 ML/M^2
BH CV ECHO MEAS - LV MASS(C)D: 76.6 GRAMS
BH CV ECHO MEAS - LV MASS(C)DI: 45.4 GRAMS/M^2
BH CV ECHO MEAS - LV MAX PG: 4.1 MMHG
BH CV ECHO MEAS - LV MEAN PG: 2 MMHG
BH CV ECHO MEAS - LV SYSTOLIC VOL/BSA (12-30): 15.4 ML/M^2
BH CV ECHO MEAS - LV V1 MAX: 101 CM/SEC
BH CV ECHO MEAS - LV V1 MEAN: 62.4 CM/SEC
BH CV ECHO MEAS - LV V1 VTI: 20.1 CM
BH CV ECHO MEAS - LVIDD: 3.9 CM
BH CV ECHO MEAS - LVIDS: 2.7 CM
BH CV ECHO MEAS - LVLD AP4: 6.9 CM
BH CV ECHO MEAS - LVLS AP4: 5.3 CM
BH CV ECHO MEAS - LVOT AREA (M): 2.5 CM^2
BH CV ECHO MEAS - LVOT AREA: 2.5 CM^2
BH CV ECHO MEAS - LVOT DIAM: 1.8 CM
BH CV ECHO MEAS - LVPWD: 0.8 CM
BH CV ECHO MEAS - MED PEAK E' VEL: 7.4 CM/SEC
BH CV ECHO MEAS - MEDIAL E/E' RATIO: 10
BH CV ECHO MEAS - MV A MAX VEL: 94.3 CM/SEC
BH CV ECHO MEAS - MV DEC TIME: 0.15 SEC
BH CV ECHO MEAS - MV E MAX VEL: 74 CM/SEC
BH CV ECHO MEAS - MV E/A: 0.78
BH CV ECHO MEAS - PA ACC SLOPE: 815 CM/SEC^2
BH CV ECHO MEAS - PA ACC TIME: 0.13 SEC
BH CV ECHO MEAS - PA PR(ACCEL): 19.6 MMHG
BH CV ECHO MEAS - PI END-D VEL: 130 CM/SEC
BH CV ECHO MEAS - RAP SYSTOLE: 3 MMHG
BH CV ECHO MEAS - RVSP: 19.3 MMHG
BH CV ECHO MEAS - SI(AO): 80.3 ML/M^2
BH CV ECHO MEAS - SI(CUBED): 22 ML/M^2
BH CV ECHO MEAS - SI(LVOT): 30.3 ML/M^2
BH CV ECHO MEAS - SI(MOD-SP4): 24.3 ML/M^2
BH CV ECHO MEAS - SI(TEICH): 21.7 ML/M^2
BH CV ECHO MEAS - SV(AO): 135.4 ML
BH CV ECHO MEAS - SV(CUBED): 37.2 ML
BH CV ECHO MEAS - SV(LVOT): 51.1 ML
BH CV ECHO MEAS - SV(MOD-SP4): 41 ML
BH CV ECHO MEAS - SV(TEICH): 36.7 ML
BH CV ECHO MEAS - TAPSE (>1.6): 2.7 CM
BH CV ECHO MEAS - TR MAX PG: 16.3 MMHG
BH CV ECHO MEAS - TR MAX VEL: 202 CM/SEC
BH CV ECHO MEASUREMENTS AVERAGE E/E' RATIO: 9.08
BH CV REST NUCLEAR ISOTOPE DOSE: 9.8 MCI
BH CV STRESS BP STAGE 1: NORMAL
BH CV STRESS BP STAGE 2: NORMAL
BH CV STRESS DURATION MIN STAGE 1: 3
BH CV STRESS DURATION MIN STAGE 2: 3
BH CV STRESS DURATION MIN STAGE 3: 0
BH CV STRESS DURATION SEC STAGE 1: 0
BH CV STRESS DURATION SEC STAGE 2: 0
BH CV STRESS DURATION SEC STAGE 3: 6
BH CV STRESS GRADE STAGE 1: 10
BH CV STRESS GRADE STAGE 2: 12
BH CV STRESS GRADE STAGE 3: 14
BH CV STRESS HR STAGE 1: 122
BH CV STRESS HR STAGE 2: 142
BH CV STRESS HR STAGE 3: 144
BH CV STRESS METS STAGE 1: 5
BH CV STRESS METS STAGE 2: 7.5
BH CV STRESS METS STAGE 3: 10
BH CV STRESS NUCLEAR ISOTOPE DOSE: 32.5 MCI
BH CV STRESS O2 STAGE 1: 95
BH CV STRESS O2 STAGE 2: 97
BH CV STRESS O2 STAGE 3: 98
BH CV STRESS PROTOCOL 1: NORMAL
BH CV STRESS RECOVERY BP: NORMAL MMHG
BH CV STRESS RECOVERY HR: 103 BPM
BH CV STRESS SPEED STAGE 1: 1.7
BH CV STRESS SPEED STAGE 2: 2.5
BH CV STRESS SPEED STAGE 3: 3.4
BH CV STRESS STAGE 1: 1
BH CV STRESS STAGE 2: 2
BH CV STRESS STAGE 3: 3
BH CV XLRA - RV BASE: 2.6 CM
BH CV XLRA - RV LENGTH: 4.6 CM
BH CV XLRA - RV MID: 2.2 CM
LV EF 2D ECHO EST: 70 %
LV EF NUC BP: 73 %
MAXIMAL PREDICTED HEART RATE: 156 BPM
PERCENT MAX PREDICTED HR: 92.31 %
STRESS BASELINE BP: NORMAL MMHG
STRESS BASELINE HR: 88 BPM
STRESS O2 SAT REST: 98 %
STRESS PERCENT HR: 109 %
STRESS POST ESTIMATED WORKLOAD: 7.2 METS
STRESS POST EXERCISE DUR MIN: 6 MIN
STRESS POST EXERCISE DUR SEC: 6 SEC
STRESS POST O2 SAT PEAK: 98 %
STRESS POST PEAK BP: NORMAL MMHG
STRESS POST PEAK HR: 144 BPM
STRESS TARGET HR: 133 BPM

## 2021-10-11 ENCOUNTER — LAB (OUTPATIENT)
Dept: LAB | Facility: HOSPITAL | Age: 66
End: 2021-10-11

## 2021-10-11 ENCOUNTER — TRANSCRIBE ORDERS (OUTPATIENT)
Dept: LAB | Facility: HOSPITAL | Age: 66
End: 2021-10-11

## 2021-10-11 DIAGNOSIS — Z01.818 PRE-OP TESTING: ICD-10-CM

## 2021-10-11 DIAGNOSIS — Z01.818 PRE-OP TESTING: Primary | ICD-10-CM

## 2021-10-11 LAB — SARS-COV-2 RNA NOSE QL NAA+PROBE: NOT DETECTED

## 2021-10-11 PROCEDURE — U0004 COV-19 TEST NON-CDC HGH THRU: HCPCS

## 2021-10-11 PROCEDURE — U0005 INFEC AGEN DETEC AMPLI PROBE: HCPCS

## 2022-01-27 ENCOUNTER — APPOINTMENT (OUTPATIENT)
Dept: GENERAL RADIOLOGY | Facility: HOSPITAL | Age: 67
End: 2022-01-27

## 2022-01-27 ENCOUNTER — HOSPITAL ENCOUNTER (EMERGENCY)
Facility: HOSPITAL | Age: 67
Discharge: HOME | End: 2022-01-27
Attending: EMERGENCY MEDICINE | Admitting: EMERGENCY MEDICINE

## 2022-01-27 VITALS
OXYGEN SATURATION: 98 % | HEART RATE: 94 BPM | DIASTOLIC BLOOD PRESSURE: 85 MMHG | RESPIRATION RATE: 16 BRPM | SYSTOLIC BLOOD PRESSURE: 151 MMHG | WEIGHT: 140 LBS | HEIGHT: 63 IN | TEMPERATURE: 97.6 F | BODY MASS INDEX: 24.8 KG/M2

## 2022-01-27 DIAGNOSIS — K08.89 PAIN, DENTAL: Primary | ICD-10-CM

## 2022-01-27 DIAGNOSIS — R11.0 NAUSEA: ICD-10-CM

## 2022-01-27 DIAGNOSIS — R74.8 ELEVATED LIVER ENZYMES: ICD-10-CM

## 2022-01-27 DIAGNOSIS — T50.905A ADVERSE REACTION TO DRUG, INITIAL ENCOUNTER: ICD-10-CM

## 2022-01-27 LAB
ALBUMIN SERPL-MCNC: 4.4 G/DL (ref 3.5–5.2)
ALBUMIN/GLOB SERPL: 1.3 G/DL
ALP SERPL-CCNC: 213 U/L (ref 39–117)
ALT SERPL W P-5'-P-CCNC: 148 U/L (ref 1–33)
AMPHET+METHAMPHET UR QL: NEGATIVE
AMPHETAMINES UR QL: NEGATIVE
ANION GAP SERPL CALCULATED.3IONS-SCNC: 13.9 MMOL/L (ref 5–15)
APAP SERPL-MCNC: <5 MCG/ML (ref 0–30)
AST SERPL-CCNC: 167 U/L (ref 1–32)
BARBITURATES UR QL SCN: NEGATIVE
BASOPHILS # BLD AUTO: 0.04 10*3/MM3 (ref 0–0.2)
BASOPHILS NFR BLD AUTO: 0.8 % (ref 0–1.5)
BENZODIAZ UR QL SCN: NEGATIVE
BILIRUB SERPL-MCNC: 0.7 MG/DL (ref 0–1.2)
BILIRUB UR QL STRIP: NEGATIVE
BUN SERPL-MCNC: 10 MG/DL (ref 8–23)
BUN/CREAT SERPL: 12.7 (ref 7–25)
BUPRENORPHINE SERPL-MCNC: NEGATIVE NG/ML
CALCIUM SPEC-SCNC: 9.4 MG/DL (ref 8.6–10.5)
CANNABINOIDS SERPL QL: NEGATIVE
CHLORIDE SERPL-SCNC: 99 MMOL/L (ref 98–107)
CLARITY UR: CLEAR
CO2 SERPL-SCNC: 23.1 MMOL/L (ref 22–29)
COCAINE UR QL: NEGATIVE
COLOR UR: YELLOW
CREAT SERPL-MCNC: 0.79 MG/DL (ref 0.57–1)
DEPRECATED RDW RBC AUTO: 45.7 FL (ref 37–54)
EOSINOPHIL # BLD AUTO: 0.03 10*3/MM3 (ref 0–0.4)
EOSINOPHIL NFR BLD AUTO: 0.6 % (ref 0.3–6.2)
ERYTHROCYTE [DISTWIDTH] IN BLOOD BY AUTOMATED COUNT: 13.9 % (ref 12.3–15.4)
GFR SERPL CREATININE-BSD FRML MDRD: 89 ML/MIN/1.73
GLOBULIN UR ELPH-MCNC: 3.3 GM/DL
GLUCOSE SERPL-MCNC: 127 MG/DL (ref 65–99)
GLUCOSE UR STRIP-MCNC: NEGATIVE MG/DL
HCT VFR BLD AUTO: 45.1 % (ref 34–46.6)
HGB BLD-MCNC: 15 G/DL (ref 12–15.9)
HGB UR QL STRIP.AUTO: NEGATIVE
IMM GRANULOCYTES # BLD AUTO: 0.01 10*3/MM3 (ref 0–0.05)
IMM GRANULOCYTES NFR BLD AUTO: 0.2 % (ref 0–0.5)
KETONES UR QL STRIP: ABNORMAL
LEUKOCYTE ESTERASE UR QL STRIP.AUTO: NEGATIVE
LIPASE SERPL-CCNC: 28 U/L (ref 13–60)
LYMPHOCYTES # BLD AUTO: 1.46 10*3/MM3 (ref 0.7–3.1)
LYMPHOCYTES NFR BLD AUTO: 30.6 % (ref 19.6–45.3)
MCH RBC QN AUTO: 30 PG (ref 26.6–33)
MCHC RBC AUTO-ENTMCNC: 33.3 G/DL (ref 31.5–35.7)
MCV RBC AUTO: 90.2 FL (ref 79–97)
METHADONE UR QL SCN: NEGATIVE
MONOCYTES # BLD AUTO: 0.32 10*3/MM3 (ref 0.1–0.9)
MONOCYTES NFR BLD AUTO: 6.7 % (ref 5–12)
NEUTROPHILS NFR BLD AUTO: 2.91 10*3/MM3 (ref 1.7–7)
NEUTROPHILS NFR BLD AUTO: 61.1 % (ref 42.7–76)
NITRITE UR QL STRIP: NEGATIVE
NRBC BLD AUTO-RTO: 0 /100 WBC (ref 0–0.2)
OPIATES UR QL: POSITIVE
OXYCODONE UR QL SCN: NEGATIVE
PCP UR QL SCN: NEGATIVE
PH UR STRIP.AUTO: 8.5 [PH] (ref 5–8)
PLATELET # BLD AUTO: 372 10*3/MM3 (ref 140–450)
PMV BLD AUTO: 10.9 FL (ref 6–12)
POTASSIUM SERPL-SCNC: 3.1 MMOL/L (ref 3.5–5.2)
PROPOXYPH UR QL: NEGATIVE
PROT SERPL-MCNC: 7.7 G/DL (ref 6–8.5)
PROT UR QL STRIP: NEGATIVE
RBC # BLD AUTO: 5 10*6/MM3 (ref 3.77–5.28)
SODIUM SERPL-SCNC: 136 MMOL/L (ref 136–145)
SP GR UR STRIP: 1.01 (ref 1–1.03)
TRICYCLICS UR QL SCN: NEGATIVE
TROPONIN T SERPL-MCNC: <0.01 NG/ML (ref 0–0.03)
UROBILINOGEN UR QL STRIP: ABNORMAL
WBC NRBC COR # BLD: 4.77 10*3/MM3 (ref 3.4–10.8)

## 2022-01-27 PROCEDURE — 80053 COMPREHEN METABOLIC PANEL: CPT | Performed by: PHYSICIAN ASSISTANT

## 2022-01-27 PROCEDURE — 71045 X-RAY EXAM CHEST 1 VIEW: CPT

## 2022-01-27 PROCEDURE — 81003 URINALYSIS AUTO W/O SCOPE: CPT | Performed by: PHYSICIAN ASSISTANT

## 2022-01-27 PROCEDURE — 80143 DRUG ASSAY ACETAMINOPHEN: CPT | Performed by: EMERGENCY MEDICINE

## 2022-01-27 PROCEDURE — 85025 COMPLETE CBC W/AUTO DIFF WBC: CPT | Performed by: PHYSICIAN ASSISTANT

## 2022-01-27 PROCEDURE — 25010000002 NALOXONE PER 1 MG: Performed by: PHYSICIAN ASSISTANT

## 2022-01-27 PROCEDURE — 96375 TX/PRO/DX INJ NEW DRUG ADDON: CPT

## 2022-01-27 PROCEDURE — 93005 ELECTROCARDIOGRAM TRACING: CPT | Performed by: PHYSICIAN ASSISTANT

## 2022-01-27 PROCEDURE — 99283 EMERGENCY DEPT VISIT LOW MDM: CPT

## 2022-01-27 PROCEDURE — 96374 THER/PROPH/DIAG INJ IV PUSH: CPT

## 2022-01-27 PROCEDURE — 96376 TX/PRO/DX INJ SAME DRUG ADON: CPT

## 2022-01-27 PROCEDURE — 84484 ASSAY OF TROPONIN QUANT: CPT | Performed by: PHYSICIAN ASSISTANT

## 2022-01-27 PROCEDURE — 80306 DRUG TEST PRSMV INSTRMNT: CPT | Performed by: PHYSICIAN ASSISTANT

## 2022-01-27 PROCEDURE — 83690 ASSAY OF LIPASE: CPT | Performed by: PHYSICIAN ASSISTANT

## 2022-01-27 PROCEDURE — 25010000002 ONDANSETRON PER 1 MG: Performed by: PHYSICIAN ASSISTANT

## 2022-01-27 RX ORDER — NALOXONE HCL 0.4 MG/ML
0.4 VIAL (ML) INJECTION ONCE
Status: COMPLETED | OUTPATIENT
Start: 2022-01-27 | End: 2022-01-27

## 2022-01-27 RX ORDER — ONDANSETRON 2 MG/ML
4 INJECTION INTRAMUSCULAR; INTRAVENOUS ONCE
Status: COMPLETED | OUTPATIENT
Start: 2022-01-27 | End: 2022-01-27

## 2022-01-27 RX ORDER — CLINDAMYCIN HYDROCHLORIDE 150 MG/1
150 CAPSULE ORAL 3 TIMES DAILY
Qty: 21 CAPSULE | Refills: 0 | Status: SHIPPED | OUTPATIENT
Start: 2022-01-27 | End: 2022-02-03

## 2022-01-27 RX ORDER — SODIUM CHLORIDE 0.9 % (FLUSH) 0.9 %
10 SYRINGE (ML) INJECTION AS NEEDED
Status: DISCONTINUED | OUTPATIENT
Start: 2022-01-27 | End: 2022-01-27 | Stop reason: HOSPADM

## 2022-01-27 RX ADMIN — ONDANSETRON 4 MG: 2 INJECTION INTRAMUSCULAR; INTRAVENOUS at 14:04

## 2022-01-27 RX ADMIN — SODIUM CHLORIDE 1000 ML: 9 INJECTION, SOLUTION INTRAVENOUS at 13:59

## 2022-01-27 RX ADMIN — NALOXONE HYDROCHLORIDE 0.4 MG: 0.4 INJECTION, SOLUTION INTRAMUSCULAR; INTRAVENOUS; SUBCUTANEOUS at 14:02

## 2022-01-27 RX ADMIN — ONDANSETRON 4 MG: 2 INJECTION INTRAMUSCULAR; INTRAVENOUS at 15:19

## 2022-04-13 ENCOUNTER — TRANSCRIBE ORDERS (OUTPATIENT)
Dept: ADMINISTRATIVE | Facility: HOSPITAL | Age: 67
End: 2022-04-13

## 2022-04-13 DIAGNOSIS — Z12.31 VISIT FOR SCREENING MAMMOGRAM: Primary | ICD-10-CM

## 2022-06-13 ENCOUNTER — HOSPITAL ENCOUNTER (OUTPATIENT)
Dept: MAMMOGRAPHY | Facility: HOSPITAL | Age: 67
End: 2022-06-13

## 2022-06-14 ENCOUNTER — HOSPITAL ENCOUNTER (OUTPATIENT)
Dept: MAMMOGRAPHY | Facility: HOSPITAL | Age: 67
Discharge: HOME | End: 2022-06-14
Admitting: FAMILY MEDICINE

## 2022-06-14 DIAGNOSIS — Z12.31 VISIT FOR SCREENING MAMMOGRAM: ICD-10-CM

## 2022-06-14 PROCEDURE — 77063 BREAST TOMOSYNTHESIS BI: CPT

## 2022-06-14 PROCEDURE — 77067 SCR MAMMO BI INCL CAD: CPT | Performed by: RADIOLOGY

## 2022-06-14 PROCEDURE — 77063 BREAST TOMOSYNTHESIS BI: CPT | Performed by: RADIOLOGY

## 2022-06-14 PROCEDURE — 77067 SCR MAMMO BI INCL CAD: CPT

## 2022-12-12 ENCOUNTER — HOSPITAL ENCOUNTER (OUTPATIENT)
Dept: GENERAL RADIOLOGY | Facility: HOSPITAL | Age: 67
Discharge: HOME | End: 2022-12-12
Admitting: NURSE PRACTITIONER

## 2022-12-12 ENCOUNTER — TRANSCRIBE ORDERS (OUTPATIENT)
Dept: ADMINISTRATIVE | Facility: HOSPITAL | Age: 67
End: 2022-12-12

## 2022-12-12 DIAGNOSIS — R06.02 SHORTNESS OF BREATH: Primary | ICD-10-CM

## 2022-12-12 DIAGNOSIS — R06.02 SHORTNESS OF BREATH: ICD-10-CM

## 2022-12-12 PROCEDURE — 71046 X-RAY EXAM CHEST 2 VIEWS: CPT

## 2023-01-18 ENCOUNTER — TELEPHONE (OUTPATIENT)
Dept: CARDIOLOGY | Facility: CLINIC | Age: 68
End: 2023-01-18
Payer: MEDICARE

## 2023-01-18 DIAGNOSIS — R00.2 PALPITATIONS: Primary | ICD-10-CM

## 2023-01-18 DIAGNOSIS — R06.09 DYSPNEA ON EXERTION: ICD-10-CM

## 2023-01-18 NOTE — TELEPHONE ENCOUNTER
"Patient called to report that she had been having shortness of breath on exertion, palpitations and fatigue. She says these are the same symptoms she had previously when she was seen in 2021. She was in [place] the other day walking and she had an episode where her, \"heart was skipping and thumping around in her chest\".     Denies chest pain but she says she has GERD and thinks it is heart burn. Also reports some swelling in her ankles. No recent labs on file but she had some a few months ago at PCP office (I will request these). Does not happen all the time so not sure an EKG would show anything unless she is having an episode.    Last stress 10/7/2021- No evidence of ischemia. EF > 70%.  Last echo 10/7/2021- EF 70%. Normal valves.     Did you want a monitor and f/u appt after? Any other testing?       "
Per Beti Brice APRN- order a monitor. Patient aware. Order placed.   
PAIN SCALE 5 OF 10.

## 2023-01-23 ENCOUNTER — TRANSCRIBE ORDERS (OUTPATIENT)
Dept: ADMINISTRATIVE | Facility: HOSPITAL | Age: 68
End: 2023-01-23
Payer: MEDICARE

## 2023-01-23 DIAGNOSIS — R79.89 ELEVATED LIVER FUNCTION TESTS: Primary | ICD-10-CM

## 2023-01-23 DIAGNOSIS — R10.13 ABDOMINAL PAIN, EPIGASTRIC: ICD-10-CM

## 2023-01-30 ENCOUNTER — HOSPITAL ENCOUNTER (OUTPATIENT)
Dept: CT IMAGING | Facility: HOSPITAL | Age: 68
Discharge: HOME | End: 2023-01-30
Admitting: NURSE PRACTITIONER
Payer: MEDICARE

## 2023-01-30 DIAGNOSIS — R79.89 ELEVATED LIVER FUNCTION TESTS: ICD-10-CM

## 2023-01-30 DIAGNOSIS — R10.13 ABDOMINAL PAIN, EPIGASTRIC: ICD-10-CM

## 2023-01-30 PROCEDURE — 25010000002 IOPAMIDOL 61 % SOLUTION: Performed by: NURSE PRACTITIONER

## 2023-01-30 PROCEDURE — 74177 CT ABD & PELVIS W/CONTRAST: CPT

## 2023-01-30 RX ADMIN — IOPAMIDOL 100 ML: 612 INJECTION, SOLUTION INTRAVENOUS at 11:11

## 2023-02-20 NOTE — PROGRESS NOTES
Cardiology Outpatient Visit      Identification: Melina Minaya is a 67 y.o. female who resides in [place], [place]    Reason for visit:  Angina pectoris (CMS/[facility])    Assessment     Problem List Items Addressed This Visit        Cardiac and Vasculature    Palpitations    Overview     · Unremarkable EKG, 12/18/2020  · Normal EKG 9/14/2021  · 14-day monitor (2/2023): Normal sinus rhythm with rare PVCs less than 1% burden.  Patient symptoms correspond to normal sinus rhythm.  2 episodes of short SVT longest of 4 beats         Current Assessment & Plan     · Patient's monitor is benign.    · If palpitations progress or worsen she will contact         Angina pectoris ([facility]) - Primary    Overview     · Stress echo (7/1/2015): No ischemia.  · Patient reports chest burning with exertion 9/14/2021  · MPS (10/7/2021): Normal without ischemia.  Low risk study  · Echo (10/7/2021): LVEF = 70%.  Grade 1 diastolic dysfunction.  No valvular abnormality.  RVSP less than 35 mmHg          Current Assessment & Plan     · Normal stress and echo  · Denies any chest pain.         Dyspnea on exertion    Overview     · Patient reports progressive shortness of breath 9/2021  · Echo (10/7/2021): Normal LVEF.  No significant valvular abnormality.  Grade 1 diastolic dysfunction         Current Assessment & Plan     · Feel shortness of breath is due to deconditioning given normal stress and echo  · Patient should increase physical activity to 30 minutes 4 to 5 days/week.         Essential hypertension    Overview     · Exercise echo (7/1/2015): Average exercise capacity.  No ischemia by clinical, EKG or echo criteria.    [facility] ER presentation with hypertensive urgency despite lisinopril  Target blood pressure <130/80 mmHg           Current Assessment & Plan     · Hypertension is controlled  · Continue amlodipine 5 mg daily  · Continue hydrochlorothiazide 12.5 mg daily  · Clonidine 0.1 mg tablet as needed for SBP greater than 170  "mmHg        Patient has had a negative cardiac work-up with normal echo, normal stress test in benign monitor.  Her shortness of breath and fatigue are likely due to deconditioning.  Recommend she increase her physical activity to 30 minutes most days of the week.  If her palpitations progress or worsen she will contact us.    Plan   • Patient needs to increase her physical activity to 30 minutes of aerobic activity most days of the week.  • Continue current medications for blood pressure.  Goal is to be less than 130/80.  She will monitor her blood pressures at home      Follow-up   Return in about 1 year (around 2/21/2024), or if symptoms worsen or fail to improve, for Follow-up with Dr. Mena next visit.        Subjective      Patient is a 67-year-old female who returns today for follow-up of her angina, palpitations, shortness of breath and cardiac risk factors.  The patient has not been seen since September 2021 at which time she reported chest pain and shortness of breath.  She was scheduled for a stress test and echocardiogram.  Stress test did not suggest any ischemia and echocardiogram showed normal LVEF without valvular abnormality and grade 1 diastolic dysfunction.  The patient contacted our office last month reporting shortness of breath, palpitations and fatigue.  A monitor was placed for which she wore for 2 weeks.  Results were benign and did not show any events or arrhythmias.  She had less than 1% burden of PVCs.  The patient reports her palpitations occur when she is lying in the bed particular on her left side.  The episode that prompted her to call our office occurred while she was walking through [place].  It felt as if her heart was \"starting and stopping\".  The episode made her have to stop and lean against the wall.  It was fairly brief but enough she took notice.  She denies any dizziness or near syncope with her episodes.        Review of Systems   Constitutional: Negative for " "malaise/fatigue.   Eyes: Negative for vision loss in left eye and vision loss in right eye.   Cardiovascular: Negative for chest pain, dyspnea on exertion, near-syncope, orthopnea, palpitations, paroxysmal nocturnal dyspnea and syncope.   Musculoskeletal: Negative for myalgias.   Neurological: Negative for brief paralysis, excessive daytime sleepiness, focal weakness, numbness, paresthesias and weakness.   All other systems reviewed and are negative.      Current Outpatient Medications   Medication Instructions   • amLODIPine (NORVASC) 5 mg, Oral, Daily   • cloNIDine (CATAPRES) 0.1 mg, Oral, As Needed   • dicyclomine (BENTYL) 20 mg, Oral, Every 6 Hours PRN   • docusate sodium (COLACE) 100 mg, Oral, 2 Times Daily PRN   • estradiol (MINIVELLE, VIVELLE-DOT) 0.1 MG/24HR patch 1 patch, Transdermal, 2 Times Weekly   • famotidine (PEPCID) 40 mg, Oral, Daily   • fluticasone (FLONASE) 50 MCG/ACT nasal spray INAHLE TWO PUFFS NASALLY TWO TIMES DAILY   • hydroCHLOROthiazide (HYDRODIURIL) 12.5 mg, Oral, Daily   • linaclotide (LINZESS) 290 mcg, Oral, Every Morning Before Breakfast   • sucralfate (CARAFATE) 1 g tablet 4 Times Daily   • Vitamin D3 2,000 Units, Oral, Daily       Objective     /70 (BP Location: Right arm, Patient Position: Sitting)   Pulse 79   Ht 160 cm (63\")   Wt 67.6 kg (149 lb)   SpO2 98%   BMI 26.39 kg/m²       Constitutional:       Appearance: Healthy appearance. Well-developed.   Eyes:      General: Lids are normal. No scleral icterus.     Conjunctiva/sclera: Conjunctivae normal.   HENT:      Head: Normocephalic and atraumatic.   Neck:      Thyroid: No thyromegaly.      Vascular: No carotid bruit or JVD.   Pulmonary:      Effort: Pulmonary effort is normal.      Breath sounds: Normal breath sounds. No wheezing. No rhonchi. No rales.   Cardiovascular:      Normal rate. Regular rhythm.      Murmurs: There is no murmur.      No gallop. No rub.   Pulses:     Intact distal pulses.   Edema:     Peripheral " edema absent.   Abdominal:      General: There is no distension.      Palpations: Abdomen is soft. There is no abdominal mass.   Musculoskeletal:      Cervical back: Normal range of motion. Skin:     General: Skin is warm and dry.      Findings: No rash.   Neurological:      General: No focal deficit present.      Mental Status: Alert and oriented to person, place, and time.      Gait: Gait is intact.   Psychiatric:         Attention and Perception: Attention normal.         Mood and Affect: Mood normal.         Behavior: Behavior normal.         Result Review  (reviewed with patient):            Lab Results   Component Value Date    GLUCOSE 127 (H) 01/27/2022    BUN 10 01/27/2022    CREATININE 0.79 01/27/2022    BCR 12.7 01/27/2022    K 3.1 (L) 01/27/2022    CO2 23.1 01/27/2022    CALCIUM 9.4 01/27/2022    PROTENTOTREF 7.8 12/18/2020    ALBUMIN 4.40 01/27/2022    LABIL2 1.4 12/18/2020     (H) 01/27/2022     (H) 01/27/2022     Lab Results   Component Value Date    WBC 4.77 01/27/2022    HGB 15.0 01/27/2022    HCT 45.1 01/27/2022    MCV 90.2 01/27/2022     01/27/2022     Lab Results   Component Value Date    CHLPL 208 (H) 12/18/2020    TRIG 80 12/18/2020    HDL 78 (H) 12/18/2020     (H) 12/18/2020     No results found for: HGBA1C    Advance Care Planning   ACP discussion was held with the patient during this visit. Patient does not have an advance directive, information provided.     GENE Richmond  2/21/2023

## 2023-02-21 ENCOUNTER — OFFICE VISIT (OUTPATIENT)
Dept: CARDIOLOGY | Facility: CLINIC | Age: 68
End: 2023-02-21
Payer: MEDICARE

## 2023-02-21 VITALS
WEIGHT: 149 LBS | BODY MASS INDEX: 26.4 KG/M2 | HEART RATE: 79 BPM | HEIGHT: 63 IN | DIASTOLIC BLOOD PRESSURE: 70 MMHG | OXYGEN SATURATION: 98 % | SYSTOLIC BLOOD PRESSURE: 138 MMHG

## 2023-02-21 DIAGNOSIS — R06.09 DYSPNEA ON EXERTION: ICD-10-CM

## 2023-02-21 DIAGNOSIS — I20.9 ANGINA PECTORIS: Primary | ICD-10-CM

## 2023-02-21 DIAGNOSIS — R00.2 PALPITATIONS: ICD-10-CM

## 2023-02-21 DIAGNOSIS — I10 ESSENTIAL HYPERTENSION: ICD-10-CM

## 2023-02-21 PROCEDURE — 99214 OFFICE O/P EST MOD 30 MIN: CPT | Performed by: NURSE PRACTITIONER

## 2023-02-21 RX ORDER — SUCRALFATE 1 G/1
TABLET ORAL 4 TIMES DAILY
COMMUNITY
Start: 2023-02-14

## 2023-02-21 NOTE — ASSESSMENT & PLAN NOTE
· Feel shortness of breath is due to deconditioning given normal stress and echo  · Patient should increase physical activity to 30 minutes 4 to 5 days/week.

## 2023-02-21 NOTE — ASSESSMENT & PLAN NOTE
· Hypertension is controlled  · Continue amlodipine 5 mg daily  · Continue hydrochlorothiazide 12.5 mg daily  · Clonidine 0.1 mg tablet as needed for SBP greater than 170 mmHg

## 2023-05-03 ENCOUNTER — TRANSCRIBE ORDERS (OUTPATIENT)
Dept: NUTRITION | Facility: HOSPITAL | Age: 68
End: 2023-05-03
Payer: MEDICARE

## 2023-05-03 DIAGNOSIS — K58.9 IRRITABLE BOWEL SYNDROME, UNSPECIFIED TYPE: Primary | ICD-10-CM

## 2023-05-03 DIAGNOSIS — K21.9 GASTROESOPHAGEAL REFLUX DISEASE, UNSPECIFIED WHETHER ESOPHAGITIS PRESENT: ICD-10-CM

## 2023-05-16 ENCOUNTER — TRANSCRIBE ORDERS (OUTPATIENT)
Dept: ADMINISTRATIVE | Facility: HOSPITAL | Age: 68
End: 2023-05-16
Payer: MEDICARE

## 2023-05-16 DIAGNOSIS — R13.10 DYSPHAGIA, UNSPECIFIED TYPE: Primary | ICD-10-CM

## 2023-06-13 ENCOUNTER — TRANSCRIBE ORDERS (OUTPATIENT)
Dept: ADMINISTRATIVE | Facility: HOSPITAL | Age: 68
End: 2023-06-13
Payer: MEDICARE

## 2023-06-13 DIAGNOSIS — Z12.31 VISIT FOR SCREENING MAMMOGRAM: Primary | ICD-10-CM

## 2023-08-09 ENCOUNTER — HOSPITAL ENCOUNTER (OUTPATIENT)
Dept: GENERAL RADIOLOGY | Facility: HOSPITAL | Age: 68
Discharge: HOME | End: 2023-08-09
Admitting: NURSE PRACTITIONER
Payer: MEDICARE

## 2023-08-09 ENCOUNTER — TRANSCRIBE ORDERS (OUTPATIENT)
Dept: GENERAL RADIOLOGY | Facility: HOSPITAL | Age: 68
End: 2023-08-09
Payer: MEDICARE

## 2023-08-09 DIAGNOSIS — M25.552 LEFT HIP PAIN: ICD-10-CM

## 2023-08-09 DIAGNOSIS — M25.552 LEFT HIP PAIN: Primary | ICD-10-CM

## 2023-08-09 PROCEDURE — 73502 X-RAY EXAM HIP UNI 2-3 VIEWS: CPT

## 2024-03-06 ENCOUNTER — TRANSCRIBE ORDERS (OUTPATIENT)
Dept: GENERAL RADIOLOGY | Facility: HOSPITAL | Age: 69
End: 2024-03-06
Payer: MEDICARE

## 2024-03-06 ENCOUNTER — HOSPITAL ENCOUNTER (OUTPATIENT)
Dept: GENERAL RADIOLOGY | Facility: HOSPITAL | Age: 69
Discharge: HOME | End: 2024-03-06
Admitting: NURSE PRACTITIONER
Payer: MEDICARE

## 2024-03-06 DIAGNOSIS — R07.89 RIGHT-SIDED CHEST WALL PAIN: Primary | ICD-10-CM

## 2024-03-06 PROCEDURE — 71101 X-RAY EXAM UNILAT RIBS/CHEST: CPT

## 2024-08-07 ENCOUNTER — TRANSCRIBE ORDERS (OUTPATIENT)
Dept: ADMINISTRATIVE | Facility: HOSPITAL | Age: 69
End: 2024-08-07
Payer: MEDICARE

## 2024-08-07 DIAGNOSIS — Z12.31 SCREENING MAMMOGRAM FOR BREAST CANCER: Primary | ICD-10-CM

## 2024-08-12 ENCOUNTER — HOSPITAL ENCOUNTER (OUTPATIENT)
Dept: MAMMOGRAPHY | Facility: HOSPITAL | Age: 69
Discharge: HOME | End: 2024-08-12
Admitting: NURSE PRACTITIONER
Payer: MEDICARE

## 2024-08-12 DIAGNOSIS — Z12.31 SCREENING MAMMOGRAM FOR BREAST CANCER: ICD-10-CM

## 2024-08-12 LAB
NCCN CRITERIA FLAG: NORMAL
TYRER CUZICK SCORE: 11

## 2024-08-12 PROCEDURE — 77067 SCR MAMMO BI INCL CAD: CPT

## 2024-08-12 PROCEDURE — 77063 BREAST TOMOSYNTHESIS BI: CPT

## 2024-08-14 PROCEDURE — 77067 SCR MAMMO BI INCL CAD: CPT | Performed by: RADIOLOGY

## 2024-08-14 PROCEDURE — 77063 BREAST TOMOSYNTHESIS BI: CPT | Performed by: RADIOLOGY

## 2025-07-11 ENCOUNTER — TRANSCRIBE ORDERS (OUTPATIENT)
Dept: ADMINISTRATIVE | Facility: HOSPITAL | Age: 70
End: 2025-07-11
Payer: MEDICARE

## 2025-07-11 DIAGNOSIS — Z12.31 SCREENING MAMMOGRAM FOR BREAST CANCER: Primary | ICD-10-CM

## 2025-07-16 ENCOUNTER — TRANSCRIBE ORDERS (OUTPATIENT)
Dept: ADMINISTRATIVE | Facility: HOSPITAL | Age: 70
End: 2025-07-16
Payer: MEDICARE

## 2025-07-16 DIAGNOSIS — N64.4 BREAST PAIN, LEFT: Primary | ICD-10-CM

## 2025-07-21 NOTE — PROGRESS NOTES
"    Cardiology Outpatient Visit      Identification: Melina Minaya is a 69 y.o. female who resides in [employer], [place]    Reason for visit:  Palpitations      Subjective      Patient is a 69-year-old female who returns today for follow-up of her chest pain, shortness of breath, palpitations and cardiac risk factors.  Patient has not been seen for the past 2 years.  At her last visit she had reported palpitations and shortness of breath.  She wore a monitor for 2 weeks and results were benign.  She had less than 1% burden of PVCs.  The patient had a stress test and echo in 2021 during her initial evaluation for chest pain.  Studies were normal at that time.  The patient has not been having any chest pain but she reports still having palpitations.  She had 1 episode that occurred months ago where she thought her heart \"stopped\".  She did not particularly feel symptomatic but it \"scared\" her.  She reports she has been extremely sedentary.  When she does try to get up and do things she feels short of breath and it makes her feel \"sick\".  She reports some lower extremity edema on the amlodipine for which primary care is treating her with HCTZ.  She has saw little improvement in her swelling so they are changing her to Maxide.    Review of Systems   Constitutional: Negative for malaise/fatigue.   Eyes:  Negative for vision loss in left eye and vision loss in right eye.   Cardiovascular:  Positive for dyspnea on exertion, leg swelling and palpitations. Negative for chest pain, near-syncope, orthopnea, paroxysmal nocturnal dyspnea and syncope.   Musculoskeletal:  Negative for myalgias.   Neurological:  Negative for brief paralysis, excessive daytime sleepiness, focal weakness, numbness, paresthesias and weakness.   All other systems reviewed and are negative.      Allergies   Allergen Reactions    Ceclor [Cefaclor]     Hydrocodone-Acetaminophen Unknown - Low Severity     Sedation    Lactose Intolerance (Gi)  " "        Current Outpatient Medications   Medication Instructions    amLODIPine (NORVASC) 10 mg, Oral, Daily    dicyclomine (BENTYL) 20 mg, Every 6 Hours PRN    estradiol (MINIVELLE, VIVELLE-DOT) 0.1 MG/24HR patch 1 patch, 2 Times Weekly    famotidine (PEPCID) 40 mg, Daily    fluticasone (FLONASE) 50 MCG/ACT nasal spray INAHLE TWO PUFFS NASALLY TWO TIMES DAILY    hydroCHLOROthiazide 12.5 mg, Oral, Daily    pantoprazole (PROTONIX) 20 mg, Daily    sucralfate (CARAFATE) 1 g tablet 4 Times Daily    Vitamin D3 2,000 Units, Daily         Objective     /62 (BP Location: Right arm, Patient Position: Sitting, Cuff Size: Adult)   Pulse 68   Ht 160 cm (63\")   Wt 70.8 kg (156 lb)   SpO2 97%   BMI 27.63 kg/m²       Constitutional:       Appearance: Healthy appearance. Well-developed.   Eyes:      General: Lids are normal. No scleral icterus.     Conjunctiva/sclera: Conjunctivae normal.   HENT:      Head: Normocephalic and atraumatic.   Neck:      Thyroid: No thyromegaly.      Vascular: No carotid bruit or JVD.   Pulmonary:      Effort: Pulmonary effort is normal.      Breath sounds: Normal breath sounds. No wheezing. No rhonchi. No rales.   Cardiovascular:      Normal rate. Regular rhythm.      Murmurs: There is no murmur.      No gallop.  No rub.   Pulses:     Intact distal pulses.   Edema:     Peripheral edema absent.   Abdominal:      General: There is no distension.      Palpations: Abdomen is soft. There is no abdominal mass.   Musculoskeletal:      Cervical back: Normal range of motion. Skin:     General: Skin is warm and dry.      Findings: No rash.   Neurological:      General: No focal deficit present.      Mental Status: Alert and oriented to person, place, and time.      Gait: Gait is intact.   Psychiatric:         Attention and Perception: Attention normal.         Mood and Affect: Mood normal.         Behavior: Behavior normal.         Result Review  (reviewed with patient):        ECG 12 " "Lead    Date/Time: 7/23/2025 2:21 PM  Performed by: Vi Brice APRN    Authorized by: Vi Brice APRN  Comparison: compared with previous ECG from 3/4/2024  Similar to previous ECG  Rhythm: sinus rhythm  BPM: 68    Clinical impression: normal ECG  Comments: QT/QTc 4 2/427 MS           Lab Results   Component Value Date    GLUCOSE 127 (H) 01/27/2022    BUN 10 01/27/2022    CREATININE 0.79 01/27/2022     01/27/2022    K 3.1 (L) 01/27/2022    CL 99 01/27/2022    CALCIUM 9.4 01/27/2022    PROTEINTOT 7.7 01/27/2022    ALBUMIN 4.40 01/27/2022     (H) 01/27/2022     (H) 01/27/2022    ALKPHOS 213 (H) 01/27/2022    BILITOT 0.7 01/27/2022    GLOB 3.3 01/27/2022    AGRATIO 1.3 01/27/2022    BCR 12.7 01/27/2022    ANIONGAP 13.9 01/27/2022     Lab Results   Component Value Date    WBC 4.77 01/27/2022    HGB 15.0 01/27/2022    HCT 45.1 01/27/2022    MCV 90.2 01/27/2022     01/27/2022     Lab Results   Component Value Date    CHLPL 208 (H) 12/18/2020    TRIG 80 12/18/2020    HDL 78 (H) 12/18/2020     (H) 12/18/2020     No results found for: \"HGBA1C\"          Assessment     Diagnoses and all orders for this visit:    1. Angina pectoris (Primary)  Overview:  Stress echo (7/1/2015): No ischemia.  Patient reports chest burning with exertion 9/14/2021  MPS (10/7/2021): Normal without ischemia.  Low risk study  Echo (10/7/2021): LVEF = 70%.  Grade 1 diastolic dysfunction.  No valvular abnormality.  RVSP less than 35 mmHg     Assessment & Plan:  No chest pain      2. Dyspnea on exertion  Overview:  Patient reports progressive shortness of breath 9/2021  Echo (10/7/2021): Normal LVEF.  No significant valvular abnormality.  Grade 1 diastolic dysfunction    Assessment & Plan:  Reports shortness of breath but has been very sedentary  Patient will increase physical activity and if symptoms not improved over the next several weeks she will contact us and we will get a CT coronary " angiogram      3. Essential hypertension  Overview:  Exercise echo (7/1/2015): Average exercise capacity.  No ischemia by clinical, EKG or echo criteria.    [facility] ER presentation with hypertensive urgency despite lisinopril  Target blood pressure <130/80 mmHg      Assessment & Plan:  Hypertension is controlled but patient having lower extremity edema from amlodipine.  Would recommend decreasing amlodipine to 5 mg daily and starting carvedilol but patient prefers to discuss with PCP.    Orders:  -     amLODIPine (NORVASC) 10 MG tablet; Take 1 tablet by mouth Daily.    4. Palpitations  Overview:  Unremarkable EKG, 12/18/2020  Normal EKG 9/14/2021  14-day monitor (2/2023): Normal sinus rhythm with rare PVCs less than 1% burden.  Patient symptoms correspond to normal sinus rhythm.  2 episodes of short SVT longest of 4 beats    Assessment & Plan:  Shunts are basically unchanged.  If they progress or worsen she will contact us and we will place monitor      Other orders  -     ECG 12 Lead          Plan   Patient is not having any exertional angina.  We discussed obtaining a CT coronary angiogram but she prefers to wait and increase her physical activity to see if her symptoms improve.  I feel this is reasonable.  If her shortness of breath and stamina does not improve with consistent exercise she will contact us and we will order CT coronary angiogram at that time  Discussed placing monitor but patient prefers to wait.  She does have some anxiety that I feel is perpetuating her symptoms.  If her palpitations progress or worsen she will contact us and we will place 2-week monitor at that time  I would personally recommend amlodipine be decreased to 5 mg daily due to lower extremity edema and placed patient on carvedilol to help palpitations but patient prefers to wait and discuss the medication changes with her primary care provider  I will have her return in 3 months for reassessment      Follow-up   Return in about  3 months (around 10/23/2025), or if symptoms worsen or fail to improve.        Vi Brice, APRN  7/23/2025

## 2025-07-23 ENCOUNTER — DOCUMENTATION (OUTPATIENT)
Dept: CARDIOLOGY | Facility: CLINIC | Age: 70
End: 2025-07-23

## 2025-07-23 ENCOUNTER — OFFICE VISIT (OUTPATIENT)
Dept: CARDIOLOGY | Facility: CLINIC | Age: 70
End: 2025-07-23
Payer: MEDICARE

## 2025-07-23 VITALS
HEIGHT: 63 IN | WEIGHT: 156 LBS | HEART RATE: 68 BPM | DIASTOLIC BLOOD PRESSURE: 62 MMHG | BODY MASS INDEX: 27.64 KG/M2 | OXYGEN SATURATION: 97 % | SYSTOLIC BLOOD PRESSURE: 124 MMHG

## 2025-07-23 DIAGNOSIS — I10 ESSENTIAL HYPERTENSION: ICD-10-CM

## 2025-07-23 DIAGNOSIS — R06.09 DYSPNEA ON EXERTION: ICD-10-CM

## 2025-07-23 DIAGNOSIS — I20.9 ANGINA PECTORIS: Primary | ICD-10-CM

## 2025-07-23 DIAGNOSIS — R00.2 PALPITATIONS: ICD-10-CM

## 2025-07-23 PROCEDURE — 1160F RVW MEDS BY RX/DR IN RCRD: CPT | Performed by: NURSE PRACTITIONER

## 2025-07-23 PROCEDURE — 1159F MED LIST DOCD IN RCRD: CPT | Performed by: NURSE PRACTITIONER

## 2025-07-23 PROCEDURE — 3074F SYST BP LT 130 MM HG: CPT | Performed by: NURSE PRACTITIONER

## 2025-07-23 PROCEDURE — 3078F DIAST BP <80 MM HG: CPT | Performed by: NURSE PRACTITIONER

## 2025-07-23 RX ORDER — PANTOPRAZOLE SODIUM 20 MG/1
20 TABLET, DELAYED RELEASE ORAL DAILY
COMMUNITY

## 2025-07-23 RX ORDER — AMLODIPINE BESYLATE 10 MG/1
10 TABLET ORAL DAILY
Start: 2025-07-23

## 2025-07-23 NOTE — ASSESSMENT & PLAN NOTE
Reports shortness of breath but has been very sedentary  Patient will increase physical activity and if symptoms not improved over the next several weeks she will contact us and we will get a CT coronary angiogram

## 2025-07-23 NOTE — ASSESSMENT & PLAN NOTE
Shunts are basically unchanged.  If they progress or worsen she will contact us and we will place monitor

## 2025-07-23 NOTE — ASSESSMENT & PLAN NOTE
Hypertension is controlled but patient having lower extremity edema from amlodipine.  Would recommend decreasing amlodipine to 5 mg daily and starting carvedilol but patient prefers to discuss with PCP.

## 2025-08-18 ENCOUNTER — HOSPITAL ENCOUNTER (OUTPATIENT)
Dept: ULTRASOUND IMAGING | Facility: HOSPITAL | Age: 70
Discharge: HOME | End: 2025-08-18
Payer: MEDICARE

## 2025-08-18 ENCOUNTER — HOSPITAL ENCOUNTER (OUTPATIENT)
Dept: MAMMOGRAPHY | Facility: HOSPITAL | Age: 70
Discharge: HOME | End: 2025-08-18
Payer: MEDICARE

## 2025-08-18 DIAGNOSIS — N64.4 BREAST PAIN, LEFT: ICD-10-CM

## 2025-08-18 PROCEDURE — G0279 TOMOSYNTHESIS, MAMMO: HCPCS

## 2025-08-18 PROCEDURE — 76642 ULTRASOUND BREAST LIMITED: CPT

## 2025-08-18 PROCEDURE — 77066 DX MAMMO INCL CAD BI: CPT
